# Patient Record
Sex: FEMALE | Race: WHITE | NOT HISPANIC OR LATINO | Employment: OTHER | URBAN - METROPOLITAN AREA
[De-identification: names, ages, dates, MRNs, and addresses within clinical notes are randomized per-mention and may not be internally consistent; named-entity substitution may affect disease eponyms.]

---

## 2023-06-14 ENCOUNTER — OFFICE VISIT (OUTPATIENT)
Dept: FAMILY MEDICINE CLINIC | Facility: CLINIC | Age: 68
End: 2023-06-14
Payer: COMMERCIAL

## 2023-06-14 ENCOUNTER — LAB (OUTPATIENT)
Dept: LAB | Facility: HOSPITAL | Age: 68
End: 2023-06-14
Payer: COMMERCIAL

## 2023-06-14 VITALS
TEMPERATURE: 96.5 F | SYSTOLIC BLOOD PRESSURE: 138 MMHG | HEART RATE: 68 BPM | WEIGHT: 225.6 LBS | HEIGHT: 60 IN | DIASTOLIC BLOOD PRESSURE: 80 MMHG | BODY MASS INDEX: 44.29 KG/M2

## 2023-06-14 DIAGNOSIS — E55.9 VITAMIN D DEFICIENCY: ICD-10-CM

## 2023-06-14 DIAGNOSIS — I10 PRIMARY HYPERTENSION: ICD-10-CM

## 2023-06-14 DIAGNOSIS — I10 PRIMARY HYPERTENSION: Primary | ICD-10-CM

## 2023-06-14 LAB
25(OH)D3 SERPL-MCNC: 52.7 NG/ML (ref 30–100)
ANION GAP SERPL CALCULATED.3IONS-SCNC: 12 MMOL/L (ref 5–15)
BUN SERPL-MCNC: 23 MG/DL (ref 8–23)
BUN/CREAT SERPL: 25 (ref 7–25)
CALCIUM SPEC-SCNC: 10.1 MG/DL (ref 8.6–10.5)
CHLORIDE SERPL-SCNC: 104 MMOL/L (ref 98–107)
CO2 SERPL-SCNC: 22 MMOL/L (ref 22–29)
CREAT SERPL-MCNC: 0.92 MG/DL (ref 0.57–1)
EGFRCR SERPLBLD CKD-EPI 2021: 68.4 ML/MIN/1.73
GLUCOSE SERPL-MCNC: 93 MG/DL (ref 65–99)
POTASSIUM SERPL-SCNC: 4.4 MMOL/L (ref 3.5–5.2)
SODIUM SERPL-SCNC: 138 MMOL/L (ref 136–145)

## 2023-06-14 PROCEDURE — 82306 VITAMIN D 25 HYDROXY: CPT

## 2023-06-14 PROCEDURE — 80048 BASIC METABOLIC PNL TOTAL CA: CPT

## 2023-06-14 PROCEDURE — 99203 OFFICE O/P NEW LOW 30 MIN: CPT | Performed by: FAMILY MEDICINE

## 2023-06-14 RX ORDER — FLUTICASONE PROPIONATE 50 MCG
1 SPRAY, SUSPENSION (ML) NASAL DAILY
COMMUNITY
Start: 2023-04-05

## 2023-06-14 RX ORDER — MULTIVIT-MIN/IRON/FOLIC ACID/K 18-600-40
2000 CAPSULE ORAL DAILY
COMMUNITY

## 2023-06-14 RX ORDER — NICOTINE POLACRILEX 4 MG/1
1 GUM, CHEWING ORAL DAILY
COMMUNITY

## 2023-06-14 RX ORDER — LOSARTAN POTASSIUM 50 MG/1
1 TABLET ORAL DAILY
COMMUNITY
Start: 2023-04-17 | End: 2023-06-14 | Stop reason: SDUPTHER

## 2023-06-14 RX ORDER — LOSARTAN POTASSIUM 50 MG/1
50 TABLET ORAL DAILY
Qty: 90 TABLET | Refills: 3 | Status: SHIPPED | OUTPATIENT
Start: 2023-06-14

## 2023-06-14 RX ORDER — LORATADINE 10 MG/1
1 CAPSULE, LIQUID FILLED ORAL DAILY
COMMUNITY

## 2023-06-14 NOTE — ASSESSMENT & PLAN NOTE
Fasting BMP today. Refill of losartan 50mg sent to pharmacy today. Goal <135/80.  Lifestyle changes, diet, increased exercise.

## 2023-06-14 NOTE — PROGRESS NOTES
New Patient Office Visit      Patient Name: Stella Lopez  : 1955   MRN: [id number]     Chief Complaint:    Chief Complaint   Patient presents with    Hypertension     New pt wants to establish care and discuss HTN rx       Subjective   History of Present Illness    History of Present Illness: Stella Lopez is a 68 y.o. female who is here today to establish care.    Previous primary care: Dr. Francis Doe [place]    Chronic health conditions:  HTN: Has been on losartan for years, no recent changes    Other physicians currently involved in patient's care:  Patient Care Team:  Silvino Carmona DO as PCP - General (Family Medicine)    Acute Concerns:  She denies acute concerns today.    This patient is accompanied by their self who contributes to the history of their care.    The following portions of the patient's history were reviewed and updated as appropriate: allergies, current medications, past family history, past medical history, past social history, past surgical history and problem list.    Subjective      Review of Systems:   Review of Systems - See HPI and new patient paperwork scanned into chart    Past Medical History:   Past Medical History:   Diagnosis Date    Allergic 1970    I had allergies most of my life    GERD (gastroesophageal reflux disease) 2020    Meds helps    Hypertension 3/15/2010    Date is approx    Obesity 2005    Ever since menapose       Past Surgical History:   Past Surgical History:   Procedure Laterality Date    COLONOSCOPY  3/27/22    Polyps  removed non cancerous       Family History:   Family History   Problem Relation Age of Onset    Cancer Mother         She several yrs       Social History:   Social History     Socioeconomic History    Marital status:    Tobacco Use    Smoking status: Former     Packs/day: 1.00     Years: 10.00     Pack years: 10.00     Types: Cigarettes     Start date: 1974     Quit date: 1985     Years since  "quittin.1    Smokeless tobacco: Never    Tobacco comments:     Quit several urs and haven't smoked since   Vaping Use    Vaping Use: Never used   Substance and Sexual Activity    Alcohol use: Yes     Comment: I drink occasional glassof wine    Drug use: Never    Sexual activity: Not Currently     Partners: Male     Birth control/protection: Pill     Comment: Na       Tobacco History:   Social History     Tobacco Use   Smoking Status Former    Packs/day: 1.00    Years: 10.00    Pack years: 10.00    Types: Cigarettes    Start date: 1974    Quit date: 1985    Years since quittin.1   Smokeless Tobacco Never   Tobacco Comments    Quit several urs and haven't smoked since       Medications:     Current Outpatient Medications:     fluticasone (FLONASE) 50 MCG/ACT nasal spray, 1 spray into the nostril(s) as directed by provider Daily., Disp: , Rfl:     Loratadine 10 MG capsule, Take 1 capsule by mouth Daily., Disp: , Rfl:     losartan (COZAAR) 50 MG tablet, Take 1 tablet by mouth Daily., Disp: 90 tablet, Rfl: 3    Omeprazole 20 MG tablet delayed-release, Take 20 mg by mouth Daily., Disp: , Rfl:     Vitamin D, Cholecalciferol, 50 MCG (2000 UT) capsule, Take 1 capsule by mouth Daily. Indications: Vitamin D Deficiency, Disp: , Rfl:     Allergies:   No Known Allergies    Objective   Objective     Physical Exam:  Vital Signs:   Vitals:    23 0849   BP: 138/80   BP Location: Left arm   Patient Position: Sitting   Cuff Size: Adult   Pulse: 68   Temp: 96.5 °F (35.8 °C)   TempSrc: Infrared   Weight: 102 kg (225 lb 9.6 oz)   Height: 152.4 cm (60\")     Body mass index is 44.06 kg/m².     Physical Exam  Nursing note reviewed  Const: NAD, A&Ox4, Pleasant, Cooperative  Eyes: EOMI, no conjunctivitis  ENT: No nasal discharge present, neck supple  Cardiac: Regular rate and rhythm, no cyanosis  Resp: Respiratory rate within normal limits, no increased work of breathing, no audible wheezing or retractions noted  GI: " No distention or ascites  MSK: Motor and sensation grossly intact in bilateral upper extremities  Neurologic: CN II-XII grossly intact  Psych: Appropriate mood and behavior.  Skin: Warm, dry  Procedures/Radiology     Procedures  No radiology results for the last 7 days     Assessment & Plan   Assessment / Plan      Assessment/Plan:   Problems Addressed This Visit  Diagnoses and all orders for this visit:    1. Primary hypertension (Primary)  Assessment & Plan:  Fasting BMP today. Refill of losartan 50mg sent to pharmacy today. Goal <135/80.  Lifestyle changes, diet, increased exercise.    Orders:  -     losartan (COZAAR) 50 MG tablet; Take 1 tablet by mouth Daily.  Dispense: 90 tablet; Refill: 3  -     Basic Metabolic Panel; Future    2. Vitamin D deficiency  -     Vitamin D,25-Hydroxy; Future      Problem List Items Addressed This Visit          Cardiac and Vasculature    Primary hypertension - Primary    Overview     Losartan 50 mg daily         Current Assessment & Plan     Fasting BMP today. Refill of losartan 50mg sent to pharmacy today. Goal <135/80.  Lifestyle changes, diet, increased exercise.         Relevant Medications    losartan (COZAAR) 50 MG tablet    Other Relevant Orders    Basic Metabolic Panel       Endocrine and Metabolic    Vitamin D deficiency    Overview     GYN started her on for preventative reasons (bone density)         Relevant Orders    Vitamin D,25-Hydroxy       We will plan to obtain previous records both for chronic preventative care as well as those related to the current episode of care.  Any records that the patient brought with her today were reviewed personally by me during the visit today and will be scanned into the chart for posterity.    Discussed the nature of the disease including relevant anatomy & expected clinical course, risks, complications, implications, management, safe and proper use of medications. Plan of care reviewed with patient at the conclusion of today's  visit. Education was provided regarding diagnosis and management.  Patient verbalizes understanding of and agreement with management plan. Encouraged therapeutic lifestyle changes including low calorie diet with plenty of fruits and vegetables, daily exercise, medication compliance, and keeping scheduled follow up appointments with me and any other providers. Encouraged patient to have appointment for complete physical, fasting labs, appropriate screenings, and immunizations on an annual basis. Discussed extended office hours, shared call, and appropriate use of the ER. Discussed generally we do not prescribe chronic controlled substances from this office. Appropriate referrals will be made to pain management and psychiatry if needed. Stressed the importance and expectation of medical compliance with plan of care, medications, and follow up appointments.    There are no Patient Instructions on file for this visit.    Follow Up:   Return in about 6 months (around 12/14/2023) for Annual.      DO SHANELLE Chapman RD  [facility]  [address]  [facility] [address]  Fax [phone]  Phone [phone]

## 2023-08-10 ENCOUNTER — PATIENT MESSAGE (OUTPATIENT)
Dept: FAMILY MEDICINE CLINIC | Facility: CLINIC | Age: 68
End: 2023-08-10
Payer: COMMERCIAL

## 2023-10-18 RX ORDER — FLUTICASONE PROPIONATE 50 MCG
1 SPRAY, SUSPENSION (ML) NASAL DAILY
Qty: 50 ML | Refills: 0 | Status: SHIPPED | OUTPATIENT
Start: 2023-10-18

## 2024-01-10 RX ORDER — FLUTICASONE PROPIONATE 50 MCG
1 SPRAY, SUSPENSION (ML) NASAL DAILY
Qty: 50 ML | Refills: 0 | Status: SHIPPED | OUTPATIENT
Start: 2024-01-10

## 2024-01-26 ENCOUNTER — OFFICE VISIT (OUTPATIENT)
Dept: FAMILY MEDICINE CLINIC | Facility: CLINIC | Age: 69
End: 2024-01-26
Payer: MEDICARE

## 2024-01-26 VITALS
HEART RATE: 78 BPM | BODY MASS INDEX: 46.02 KG/M2 | OXYGEN SATURATION: 98 % | SYSTOLIC BLOOD PRESSURE: 126 MMHG | HEIGHT: 60 IN | WEIGHT: 234.4 LBS | DIASTOLIC BLOOD PRESSURE: 82 MMHG

## 2024-01-26 DIAGNOSIS — Z91.09 ENVIRONMENTAL ALLERGIES: ICD-10-CM

## 2024-01-26 DIAGNOSIS — E55.9 VITAMIN D DEFICIENCY: ICD-10-CM

## 2024-01-26 DIAGNOSIS — Z13.29 SCREENING FOR ENDOCRINE DISORDER: ICD-10-CM

## 2024-01-26 DIAGNOSIS — Z00.00 MEDICARE ANNUAL WELLNESS VISIT, SUBSEQUENT: Primary | ICD-10-CM

## 2024-01-26 DIAGNOSIS — Z78.0 ASYMPTOMATIC AGE-RELATED POSTMENOPAUSAL STATE: ICD-10-CM

## 2024-01-26 DIAGNOSIS — I10 PRIMARY HYPERTENSION: ICD-10-CM

## 2024-01-26 DIAGNOSIS — Z13.220 SCREENING FOR HYPERLIPIDEMIA: ICD-10-CM

## 2024-01-26 DIAGNOSIS — Z13.0 SCREENING FOR DEFICIENCY ANEMIA: ICD-10-CM

## 2024-01-26 PROCEDURE — G0439 PPPS, SUBSEQ VISIT: HCPCS | Performed by: FAMILY MEDICINE

## 2024-01-26 PROCEDURE — 3074F SYST BP LT 130 MM HG: CPT | Performed by: FAMILY MEDICINE

## 2024-01-26 PROCEDURE — 3079F DIAST BP 80-89 MM HG: CPT | Performed by: FAMILY MEDICINE

## 2024-01-26 RX ORDER — MONTELUKAST SODIUM 10 MG/1
10 TABLET ORAL NIGHTLY
Qty: 30 TABLET | Refills: 2 | Status: SHIPPED | OUTPATIENT
Start: 2024-01-26

## 2024-01-26 NOTE — PROGRESS NOTES
The ABCs of the Annual Wellness Visit  Subsequent Medicare Wellness Visit    Subjective      Stella Lopez is a 68 y.o. female who presents for a Subsequent Medicare Wellness Visit.    The following portions of the patient's history were reviewed and   updated as appropriate: allergies, current medications, past family history, past medical history, past social history, past surgical history, and problem list.    Compared to one year ago, the patient feels her physical   health is the same.   HTN: Losartan 50mg daily  Environmental allergies: Loratadine 10mg (previously allegra), fluticasone  Mammogram July 2023  Colonoscopy March 2023 in [place]    The following portions of the patient's history were reviewed and updated as appropriate: allergies, current medications, past family history, past medical history, past social history, past surgical history and problem list.       Allied Screenings    N/A         Date      Eye Exam   Dec 2023    []              [x]   Up to date    Location:   []   Recommended       Counseled every 2 years in those without known issues, yearly if wearing glasses or contacts      Dental Exam   Next Wednesday, usually every 3 months    []           []   Up to date   Location:   [x]   Recommended       Counseled, recommended cleanings every 6 months, daily brushing and flossing        Skin Cancer Screening        [x]            []   Up to date   Location:   []   Recommended Counseled on regular sunscreen wear, self-skin checks      Obesity Counseling        []        [x]   Complete   []   Nutritionist referral   []   Declined Counseled on moderate portions, low meat diet focusing on whole foods and plant-based protein       Compared to one year ago, the patient feels her mental   health is the same.    Recent Hospitalizations:  She was not admitted to the hospital during the last year.       Current Medical Providers:  Patient Care Team:  Silvino Carmona DO as PCP - General  "(Family Medicine)    Outpatient Medications Prior to Visit   Medication Sig Dispense Refill    fluticasone (FLONASE) 50 MCG/ACT nasal spray 1 spray into the nostril(s) as directed by provider Daily. 50 mL 0    Loratadine 10 MG capsule Take 1 capsule by mouth Daily.      losartan (COZAAR) 50 MG tablet Take 1 tablet by mouth Daily. 90 tablet 3    Vitamin D, Cholecalciferol, 50 MCG (2000 UT) capsule Take 1 capsule by mouth Daily. Indications: Vitamin D Deficiency      Omeprazole 20 MG tablet delayed-release Take 20 mg by mouth Daily. 90 each 1     No facility-administered medications prior to visit.       No opioid medication identified on active medication list. I have reviewed chart for other potential  high risk medication/s and harmful drug interactions in the elderly.        Aspirin is not on active medication list.  Aspirin use is not indicated based on review of current medical condition/s. Risk of harm outweighs potential benefits.  .    Patient Active Problem List   Diagnosis    Primary hypertension    Vitamin D deficiency     Advance Care Planning   Advance Care Planning     Advance Directive is not on file.  ACP discussion was held with the patient during this visit. Patient has an advance directive (not in EMR), copy requested.     Objective    Vitals:    01/26/24 1359   BP: 126/82   BP Location: Left arm   Patient Position: Sitting   Cuff Size: Adult   Pulse: 78   SpO2: 98%   Weight: 106 kg (234 lb 6.4 oz)   Height: 152.4 cm (60\")     Estimated body mass index is 45.78 kg/m² as calculated from the following:    Height as of this encounter: 152.4 cm (60\").    Weight as of this encounter: 106 kg (234 lb 6.4 oz).    Class 3 Severe Obesity (BMI >=40). Obesity-related health conditions include the following: hypertension and GERD. Obesity is unchanged. BMI is is above average; BMI management plan is completed. We discussed portion control and increasing exercise.      Does the patient have evidence of cognitive " impairment?   No  ATTENTION  What is the year: correct  What is the month of the year: correct  What is the day of the week?: correct  What is the date?: correct  MEMORY  Repeat address three times, only score third attempt: [address] [place], [place]: 5  HOW MANY ANIMALS DID THE PATIENT NAME  Verbal Fluency -- Animal Names (0-25): 22+  CLOCK DRAWING  Clock Drawing: All Correct  MEMORY RECALL  Tell me what you remember about that name and address we were repeating at the beginnin  ACE TOTAL SCORE  Total ACE Score - <25/30 strongly suggests cognitive impairment; <21/30 almost certainly shows dementia: 25           HEALTH RISK ASSESSMENT    Smoking Status:  Social History     Tobacco Use   Smoking Status Former    Packs/day: 1.00    Years: 10.00    Additional pack years: 0.00    Total pack years: 10.00    Types: Cigarettes    Start date: 1974    Quit date: 1985    Years since quittin.7   Smokeless Tobacco Never   Tobacco Comments    Quit several urs and haven’t smoked since     Alcohol Consumption:  Social History     Substance and Sexual Activity   Alcohol Use Yes    Comment: I drink occasional glassof wine     Fall Risk Screen:    STEADI Fall Risk Assessment was completed, and patient is at LOW risk for falls.Assessment completed on:2024    Depression Screenin/26/2024     2:01 PM   PHQ-2/PHQ-9 Depression Screening   Little Interest or Pleasure in Doing Things 0-->not at all   Feeling Down, Depressed or Hopeless 0-->not at all   PHQ-9: Brief Depression Severity Measure Score 0       Health Habits and Functional and Cognitive Screening:       No data to display                Age-appropriate Screening Schedule:  Refer to the list below for future screening recommendations based on patient's age, sex and/or medical conditions. Orders for these recommended tests are listed in the plan section. The patient has been provided with a written plan.    Health Maintenance    Topic Date Due    DXA SCAN  Never done    BMI FOLLOWUP  Never done    COLORECTAL CANCER SCREENING  Never done    ZOSTER VACCINE (1 of 2) Never done    HEPATITIS C SCREENING  Never done    ANNUAL WELLNESS VISIT  Never done    Pneumococcal Vaccine 65+ (1 of 1 - PCV) 08/18/2024 (Originally 5/15/2021)    TDAP/TD VACCINES (1 - Tdap) 01/26/2025 (Originally 5/15/1975)    MAMMOGRAM  07/14/2025    COVID-19 Vaccine  Completed    INFLUENZA VACCINE  Completed                  CMS Preventative Services Quick Reference  Risk Factors Identified During Encounter:    None Identified    The above risks/problems have been discussed with the patient.  Pertinent information has been shared with the patient in the After Visit Summary.    Diagnoses and all orders for this visit:    1. Primary hypertension (Primary)    2. Environmental allergies  -     montelukast (Singulair) 10 MG tablet; Take 1 tablet by mouth Every Night.  Dispense: 30 tablet; Refill: 2    3. Asymptomatic age-related postmenopausal state  -     DEXA Bone Density Axial; Future      Problem List Items Addressed This Visit          Cardiac and Vasculature    Primary hypertension    Overview     Losartan 50 mg daily            Endocrine and Metabolic    Vitamin D deficiency    Overview     GYN started her on for preventative reasons (bone density)         Relevant Orders    Vitamin D,25-Hydroxy (Completed)     Other Visit Diagnoses       Medicare annual wellness visit, subsequent    -  Primary    Environmental allergies        Relevant Medications    montelukast (Singulair) 10 MG tablet    Asymptomatic age-related postmenopausal state        Relevant Orders    DEXA Bone Density Axial    Screening for deficiency anemia        Relevant Orders    CBC & Differential (Completed)    Screening for endocrine disorder        Relevant Orders    Comprehensive Metabolic Panel (Completed)    Urinalysis With Microscopic If Indicated (No Culture) - Urine, Clean Catch (Completed)    TSH  (Completed)    Screening for hyperlipidemia        Relevant Orders    Lipid Panel (Completed)          The wellness exam has been reviewed in detail.  The patient has been fully counseled on preventative guidelines for vaccines, cancer screenings, and other health maintenance needs.  Functional testing has been performed to assess capacity for independent living and need for other medical interventions.   The patient was counseled on maintaining a lifestyle to promote good health and to minimize chronic diseases.  The patient has been assisted with scheduling healthcare procedures for the coming year and given a written document outlining these recommendations.      Follow Up:   Next Medicare Wellness visit to be scheduled in 1 year.      An After Visit Summary and PPPS were made available to the patient.

## 2024-01-26 NOTE — PATIENT INSTRUCTIONS
Advance Care Planning and Advance Directives     You make decisions on a daily basis - decisions about where you want to live, your career, your home, your life. Perhaps one of the most important decisions you face is your choice for future medical care. Take time to talk with your family and your healthcare team and start planning today.  Advance Care Planning is a process that can help you:  Understand possible future healthcare decisions in light of your own experiences  Reflect on those decision in light of your goals and values  Discuss your decisions with those closest to you and the healthcare professionals that care for you  Make a plan by creating a document that reflects your wishes    Surrogate Decision Maker  In the event of a medical emergency, which has left you unable to communicate or to make your own decisions, you would need someone to make decisions for you.  It is important to discuss your preferences for medical treatment with this person while you are in good health.     Qualities of a surrogate decision maker:  Willing to take on this role and responsibility  Knows what you want for future medical care  Willing to follow your wishes even if they don't agree with them  Able to make difficult medical decisions under stressful circumstances    Advance Directives  These are legal documents you can create that will guide your healthcare team and decision maker(s) when needed. These documents can be stored in the electronic medical record.    Living Will - a legal document to guide your care if you have a terminal condition or a serious illness and are unable to communicate. States vary by statute in document names/types, but most forms may include one or more of the following:        -  Directions regarding life-prolonging treatments        -  Directions regarding artificially provided nutrition/hydration        -  Choosing a healthcare decision maker        -  Direction regarding organ/tissue  donation    Durable Power of  for Healthcare - this document names an -in-fact to make medical decisions for you, but it may also allow this person to make personal and financial decisions for you. Please seek the advice of an  if you need this type of document.    **Advance Directives are not required and no one may discriminate against you if you do not sign one.    Medical Orders  Many states allow specific forms/orders signed by your physician to record your wishes for medical treatment in your current state of health. This form, signed in personal communication with your physician, addresses resuscitation and other medical interventions that you may or may not want.      For more information or to schedule a time with a [facility] Advance Care Planning Facilitator contact: [facility] or call [phone] and someone will contact you directly.  You are due for Shingrix vaccination series ( the newest shingles vaccine).  It is a two shot series spaced 2-6 months apart. Please get this vaccine series started at your earliest convenience at your local pharmacy to help avoid shingles outbreak. It is more effective than the old Zostavax vaccine and is recommended even if you have had the Zostavax vaccine in the past.  Once the Shingrix series is completed, it does not need to be repeated.   For more information, please look at the website below:  [facility] Shingrix Vaccine Information      Medicare Wellness  Personal Prevention Plan of Service     Date of Office Visit:    Encounter Provider:  Silvino Carmona DO  Place of Service:  [facility]  Patient Name: Stella Lopez  :  1955    As part of the Medicare Wellness portion of your visit today, we are providing you with this personalized preventive plan of services (PPPS). This plan is based upon recommendations of the [place] Preventive Services Task Force (USPSTF) and the Advisory  Committee on Immunization Practices (ACIP).    This lists the preventive care services that should be considered, and provides dates of when you are due. Items listed as completed are up-to-date and do not require any further intervention.    Health Maintenance   Topic Date Due   • DXA SCAN  Never done   • BMI FOLLOWUP  Never done   • COLORECTAL CANCER SCREENING  Never done   • ZOSTER VACCINE (1 of 2) Never done   • HEPATITIS C SCREENING  Never done   • ANNUAL WELLNESS VISIT  Never done   • Pneumococcal Vaccine 65+ (1 of 1 - PCV) 08/18/2024 (Originally 5/15/2021)   • TDAP/TD VACCINES (1 - Tdap) 01/26/2025 (Originally 5/15/1975)   • MAMMOGRAM  07/14/2025   • COVID-19 Vaccine  Completed   • INFLUENZA VACCINE  Completed       Orders Placed This Encounter   Procedures   • DEXA Bone Density Axial     Standing Status:   Future     Standing Expiration Date:   1/26/2025     Order Specific Question:   Is patient taking or have taken long term Glucocorticoid (steroids)?     Answer:   No     Order Specific Question:   Does the patient have rheumatoid arthritis?     Answer:   No     Order Specific Question:   Reason for Exam:     Answer:   osteoporosis screening     Order Specific Question:   Release to patient     Answer:   Routine Release [[id number]]       No follow-ups on file.

## 2024-01-29 ENCOUNTER — LAB (OUTPATIENT)
Dept: LAB | Facility: HOSPITAL | Age: 69
End: 2024-01-29
Payer: MEDICARE

## 2024-01-29 DIAGNOSIS — Z13.220 SCREENING FOR HYPERLIPIDEMIA: ICD-10-CM

## 2024-01-29 DIAGNOSIS — Z13.29 SCREENING FOR ENDOCRINE DISORDER: ICD-10-CM

## 2024-01-29 DIAGNOSIS — Z13.0 SCREENING FOR DEFICIENCY ANEMIA: ICD-10-CM

## 2024-01-29 DIAGNOSIS — E55.9 VITAMIN D DEFICIENCY: ICD-10-CM

## 2024-01-29 LAB
ALBUMIN SERPL-MCNC: 4.5 G/DL (ref 3.5–5.2)
ALBUMIN/GLOB SERPL: 1.4 G/DL
ALP SERPL-CCNC: 106 U/L (ref 39–117)
ALT SERPL W P-5'-P-CCNC: 14 U/L (ref 1–33)
ANION GAP SERPL CALCULATED.3IONS-SCNC: 14.5 MMOL/L (ref 5–15)
AST SERPL-CCNC: 16 U/L (ref 1–32)
BASOPHILS # BLD AUTO: 0.05 10*3/MM3 (ref 0–0.2)
BASOPHILS NFR BLD AUTO: 0.6 % (ref 0–1.5)
BILIRUB SERPL-MCNC: 0.6 MG/DL (ref 0–1.2)
BILIRUB UR QL STRIP: NEGATIVE
BUN SERPL-MCNC: 15 MG/DL (ref 8–23)
BUN/CREAT SERPL: 16.7 (ref 7–25)
CALCIUM SPEC-SCNC: 9.8 MG/DL (ref 8.6–10.5)
CHLORIDE SERPL-SCNC: 100 MMOL/L (ref 98–107)
CHOLEST SERPL-MCNC: 222 MG/DL (ref 0–200)
CLARITY UR: ABNORMAL
CO2 SERPL-SCNC: 22.5 MMOL/L (ref 22–29)
COLOR UR: YELLOW
CREAT SERPL-MCNC: 0.9 MG/DL (ref 0.57–1)
DEPRECATED RDW RBC AUTO: 41.6 FL (ref 37–54)
EGFRCR SERPLBLD CKD-EPI 2021: 70.2 ML/MIN/1.73
EOSINOPHIL # BLD AUTO: 0.23 10*3/MM3 (ref 0–0.4)
EOSINOPHIL NFR BLD AUTO: 2.9 % (ref 0.3–6.2)
ERYTHROCYTE [DISTWIDTH] IN BLOOD BY AUTOMATED COUNT: 12.1 % (ref 12.3–15.4)
GLOBULIN UR ELPH-MCNC: 3.3 GM/DL
GLUCOSE SERPL-MCNC: 76 MG/DL (ref 65–99)
GLUCOSE UR STRIP-MCNC: NEGATIVE MG/DL
HCT VFR BLD AUTO: 41.2 % (ref 34–46.6)
HDLC SERPL-MCNC: 64 MG/DL (ref 40–60)
HGB BLD-MCNC: 13.8 G/DL (ref 12–15.9)
HGB UR QL STRIP.AUTO: NEGATIVE
IMM GRANULOCYTES # BLD AUTO: 0.02 10*3/MM3 (ref 0–0.05)
IMM GRANULOCYTES NFR BLD AUTO: 0.3 % (ref 0–0.5)
KETONES UR QL STRIP: NEGATIVE
LDLC SERPL CALC-MCNC: 139 MG/DL (ref 0–100)
LDLC/HDLC SERPL: 2.13 {RATIO}
LEUKOCYTE ESTERASE UR QL STRIP.AUTO: NEGATIVE
LYMPHOCYTES # BLD AUTO: 3.15 10*3/MM3 (ref 0.7–3.1)
LYMPHOCYTES NFR BLD AUTO: 39.9 % (ref 19.6–45.3)
MCH RBC QN AUTO: 32.2 PG (ref 26.6–33)
MCHC RBC AUTO-ENTMCNC: 33.5 G/DL (ref 31.5–35.7)
MCV RBC AUTO: 96.3 FL (ref 79–97)
MONOCYTES # BLD AUTO: 0.43 10*3/MM3 (ref 0.1–0.9)
MONOCYTES NFR BLD AUTO: 5.4 % (ref 5–12)
NEUTROPHILS NFR BLD AUTO: 4.01 10*3/MM3 (ref 1.7–7)
NEUTROPHILS NFR BLD AUTO: 50.9 % (ref 42.7–76)
NITRITE UR QL STRIP: NEGATIVE
NRBC BLD AUTO-RTO: 0 /100 WBC (ref 0–0.2)
PH UR STRIP.AUTO: 6.5 [PH] (ref 5–8)
PLATELET # BLD AUTO: 372 10*3/MM3 (ref 140–450)
PMV BLD AUTO: 11.1 FL (ref 6–12)
POTASSIUM SERPL-SCNC: 4.4 MMOL/L (ref 3.5–5.2)
PROT SERPL-MCNC: 7.8 G/DL (ref 6–8.5)
PROT UR QL STRIP: NEGATIVE
RBC # BLD AUTO: 4.28 10*6/MM3 (ref 3.77–5.28)
SODIUM SERPL-SCNC: 137 MMOL/L (ref 136–145)
SP GR UR STRIP: 1.02 (ref 1–1.03)
TRIGL SERPL-MCNC: 109 MG/DL (ref 0–150)
TSH SERPL DL<=0.05 MIU/L-ACNC: 5.12 UIU/ML (ref 0.27–4.2)
UROBILINOGEN UR QL STRIP: ABNORMAL
VLDLC SERPL-MCNC: 19 MG/DL (ref 5–40)
WBC NRBC COR # BLD AUTO: 7.89 10*3/MM3 (ref 3.4–10.8)

## 2024-01-29 PROCEDURE — 80061 LIPID PANEL: CPT

## 2024-01-29 PROCEDURE — 82306 VITAMIN D 25 HYDROXY: CPT

## 2024-01-29 PROCEDURE — 84443 ASSAY THYROID STIM HORMONE: CPT

## 2024-01-29 PROCEDURE — 85025 COMPLETE CBC W/AUTO DIFF WBC: CPT

## 2024-01-29 PROCEDURE — 80053 COMPREHEN METABOLIC PANEL: CPT

## 2024-01-29 PROCEDURE — 81003 URINALYSIS AUTO W/O SCOPE: CPT

## 2024-01-30 LAB — 25(OH)D3 SERPL-MCNC: 45.2 NG/ML (ref 30–100)

## 2024-02-16 ENCOUNTER — HOSPITAL ENCOUNTER (EMERGENCY)
Facility: HOSPITAL | Age: 69
Discharge: HOME | End: 2024-02-17
Attending: EMERGENCY MEDICINE
Payer: MEDICARE

## 2024-02-16 ENCOUNTER — APPOINTMENT (OUTPATIENT)
Dept: GENERAL RADIOLOGY | Facility: HOSPITAL | Age: 69
End: 2024-02-16
Payer: MEDICARE

## 2024-02-16 ENCOUNTER — APPOINTMENT (OUTPATIENT)
Dept: CT IMAGING | Facility: HOSPITAL | Age: 69
End: 2024-02-16
Payer: MEDICARE

## 2024-02-16 DIAGNOSIS — I10 POORLY-CONTROLLED HYPERTENSION: ICD-10-CM

## 2024-02-16 DIAGNOSIS — R07.89 ATYPICAL CHEST PAIN: Primary | ICD-10-CM

## 2024-02-16 LAB
BASOPHILS # BLD AUTO: 0.07 10*3/MM3 (ref 0–0.2)
BASOPHILS NFR BLD AUTO: 0.7 % (ref 0–1.5)
DEPRECATED RDW RBC AUTO: 43.8 FL (ref 37–54)
EOSINOPHIL # BLD AUTO: 0.29 10*3/MM3 (ref 0–0.4)
EOSINOPHIL NFR BLD AUTO: 2.7 % (ref 0.3–6.2)
ERYTHROCYTE [DISTWIDTH] IN BLOOD BY AUTOMATED COUNT: 12.1 % (ref 12.3–15.4)
HCT VFR BLD AUTO: 39.5 % (ref 34–46.6)
HGB BLD-MCNC: 12.6 G/DL (ref 12–15.9)
IMM GRANULOCYTES # BLD AUTO: 0.03 10*3/MM3 (ref 0–0.05)
IMM GRANULOCYTES NFR BLD AUTO: 0.3 % (ref 0–0.5)
LYMPHOCYTES # BLD AUTO: 3.4 10*3/MM3 (ref 0.7–3.1)
LYMPHOCYTES NFR BLD AUTO: 32 % (ref 19.6–45.3)
MCH RBC QN AUTO: 31.3 PG (ref 26.6–33)
MCHC RBC AUTO-ENTMCNC: 31.9 G/DL (ref 31.5–35.7)
MCV RBC AUTO: 98.3 FL (ref 79–97)
MONOCYTES # BLD AUTO: 0.72 10*3/MM3 (ref 0.1–0.9)
MONOCYTES NFR BLD AUTO: 6.8 % (ref 5–12)
NEUTROPHILS NFR BLD AUTO: 57.5 % (ref 42.7–76)
NEUTROPHILS NFR BLD AUTO: 6.1 10*3/MM3 (ref 1.7–7)
NRBC BLD AUTO-RTO: 0 /100 WBC (ref 0–0.2)
PLATELET # BLD AUTO: 350 10*3/MM3 (ref 140–450)
PMV BLD AUTO: 10.5 FL (ref 6–12)
RBC # BLD AUTO: 4.02 10*6/MM3 (ref 3.77–5.28)
WBC NRBC COR # BLD AUTO: 10.61 10*3/MM3 (ref 3.4–10.8)

## 2024-02-16 PROCEDURE — 85379 FIBRIN DEGRADATION QUANT: CPT | Performed by: EMERGENCY MEDICINE

## 2024-02-16 PROCEDURE — 83690 ASSAY OF LIPASE: CPT

## 2024-02-16 PROCEDURE — 87636 SARSCOV2 & INF A&B AMP PRB: CPT | Performed by: EMERGENCY MEDICINE

## 2024-02-16 PROCEDURE — 71045 X-RAY EXAM CHEST 1 VIEW: CPT

## 2024-02-16 PROCEDURE — 36415 COLL VENOUS BLD VENIPUNCTURE: CPT

## 2024-02-16 PROCEDURE — 83605 ASSAY OF LACTIC ACID: CPT | Performed by: EMERGENCY MEDICINE

## 2024-02-16 PROCEDURE — 99284 EMERGENCY DEPT VISIT MOD MDM: CPT

## 2024-02-16 PROCEDURE — 83880 ASSAY OF NATRIURETIC PEPTIDE: CPT

## 2024-02-16 PROCEDURE — 93005 ELECTROCARDIOGRAM TRACING: CPT

## 2024-02-16 PROCEDURE — 85025 COMPLETE CBC W/AUTO DIFF WBC: CPT

## 2024-02-16 RX ORDER — ASPIRIN 81 MG/1
324 TABLET, CHEWABLE ORAL ONCE
Status: DISCONTINUED | OUTPATIENT
Start: 2024-02-16 | End: 2024-02-16

## 2024-02-16 RX ORDER — SODIUM CHLORIDE 0.9 % (FLUSH) 0.9 %
10 SYRINGE (ML) INJECTION AS NEEDED
Status: DISCONTINUED | OUTPATIENT
Start: 2024-02-16 | End: 2024-02-17 | Stop reason: HOSPADM

## 2024-02-17 VITALS
WEIGHT: 225 LBS | OXYGEN SATURATION: 96 % | TEMPERATURE: 97.5 F | BODY MASS INDEX: 44.17 KG/M2 | SYSTOLIC BLOOD PRESSURE: 156 MMHG | HEART RATE: 69 BPM | HEIGHT: 60 IN | RESPIRATION RATE: 14 BRPM | DIASTOLIC BLOOD PRESSURE: 76 MMHG

## 2024-02-17 LAB
ALBUMIN SERPL-MCNC: 4.6 G/DL (ref 3.5–5.2)
ALBUMIN/GLOB SERPL: 1.8 G/DL
ALP SERPL-CCNC: 111 U/L (ref 39–117)
ALT SERPL W P-5'-P-CCNC: 10 U/L (ref 1–33)
ANION GAP SERPL CALCULATED.3IONS-SCNC: 10 MMOL/L (ref 5–15)
AST SERPL-CCNC: 14 U/L (ref 1–32)
BILIRUB SERPL-MCNC: 0.2 MG/DL (ref 0–1.2)
BUN SERPL-MCNC: 20 MG/DL (ref 8–23)
BUN/CREAT SERPL: 21.7 (ref 7–25)
CALCIUM SPEC-SCNC: 9.4 MG/DL (ref 8.6–10.5)
CHLORIDE SERPL-SCNC: 101 MMOL/L (ref 98–107)
CO2 SERPL-SCNC: 25 MMOL/L (ref 22–29)
CREAT SERPL-MCNC: 0.92 MG/DL (ref 0.57–1)
D DIMER PPP FEU-MCNC: 0.35 MCGFEU/ML (ref 0–0.67)
D-LACTATE SERPL-SCNC: 1.2 MMOL/L (ref 0.5–2)
EGFRCR SERPLBLD CKD-EPI 2021: 68.4 ML/MIN/1.73
FLUAV RNA RESP QL NAA+PROBE: NOT DETECTED
FLUBV RNA RESP QL NAA+PROBE: NOT DETECTED
GLOBULIN UR ELPH-MCNC: 2.5 GM/DL
GLUCOSE SERPL-MCNC: 104 MG/DL (ref 65–99)
HOLD SPECIMEN: NORMAL
LIPASE SERPL-CCNC: 42 U/L (ref 13–60)
NT-PROBNP SERPL-MCNC: 61.6 PG/ML (ref 0–900)
POTASSIUM SERPL-SCNC: 4.2 MMOL/L (ref 3.5–5.2)
PROT SERPL-MCNC: 7.1 G/DL (ref 6–8.5)
QT INTERVAL: 394 MS
QTC INTERVAL: 449 MS
SARS-COV-2 RNA RESP QL NAA+PROBE: NOT DETECTED
SODIUM SERPL-SCNC: 136 MMOL/L (ref 136–145)
TROPONIN T SERPL HS-MCNC: 8 NG/L
WHOLE BLOOD HOLD COAG: NORMAL
WHOLE BLOOD HOLD SPECIMEN: NORMAL

## 2024-02-17 PROCEDURE — 80053 COMPREHEN METABOLIC PANEL: CPT

## 2024-02-17 PROCEDURE — 84484 ASSAY OF TROPONIN QUANT: CPT

## 2024-02-17 PROCEDURE — 25010000002 KETOROLAC TROMETHAMINE PER 15 MG: Performed by: EMERGENCY MEDICINE

## 2024-02-17 PROCEDURE — 96374 THER/PROPH/DIAG INJ IV PUSH: CPT

## 2024-02-17 RX ORDER — DICYCLOMINE HYDROCHLORIDE 10 MG/1
20 CAPSULE ORAL ONCE
Status: COMPLETED | OUTPATIENT
Start: 2024-02-17 | End: 2024-02-17

## 2024-02-17 RX ORDER — KETOROLAC TROMETHAMINE 15 MG/ML
15 INJECTION, SOLUTION INTRAMUSCULAR; INTRAVENOUS ONCE
Status: COMPLETED | OUTPATIENT
Start: 2024-02-17 | End: 2024-02-17

## 2024-02-17 RX ORDER — DICYCLOMINE HCL 20 MG
20 TABLET ORAL EVERY 6 HOURS
Qty: 30 TABLET | Refills: 0 | Status: SHIPPED | OUTPATIENT
Start: 2024-02-17

## 2024-02-17 RX ORDER — SIMETHICONE 80 MG
120 TABLET,CHEWABLE ORAL ONCE
Status: COMPLETED | OUTPATIENT
Start: 2024-02-17 | End: 2024-02-17

## 2024-02-17 RX ORDER — SIMETHICONE 125 MG
1 CAPSULE ORAL EVERY 6 HOURS PRN
Qty: 30 CAPSULE | Refills: 0 | Status: SHIPPED | OUTPATIENT
Start: 2024-02-17

## 2024-02-17 RX ADMIN — DICYCLOMINE HYDROCHLORIDE 20 MG: 10 CAPSULE ORAL at 02:44

## 2024-02-17 RX ADMIN — KETOROLAC TROMETHAMINE 15 MG: 15 INJECTION, SOLUTION INTRAMUSCULAR; INTRAVENOUS at 00:46

## 2024-02-17 RX ADMIN — SIMETHICONE 120 MG: 80 TABLET, CHEWABLE ORAL at 02:44

## 2024-02-17 NOTE — DISCHARGE INSTRUCTIONS
You can take Tylenol and ibuprofen as needed for pain as directed on package.  Continue your usual medications as prescribed.

## 2024-02-20 ENCOUNTER — OFFICE VISIT (OUTPATIENT)
Dept: FAMILY MEDICINE CLINIC | Facility: CLINIC | Age: 69
End: 2024-02-20
Payer: MEDICARE

## 2024-02-20 VITALS
HEIGHT: 60 IN | DIASTOLIC BLOOD PRESSURE: 72 MMHG | BODY MASS INDEX: 44.8 KG/M2 | SYSTOLIC BLOOD PRESSURE: 118 MMHG | WEIGHT: 228.2 LBS

## 2024-02-20 DIAGNOSIS — R10.11 RIGHT UPPER QUADRANT ABDOMINAL PAIN: ICD-10-CM

## 2024-02-20 DIAGNOSIS — I10 PRIMARY HYPERTENSION: Primary | ICD-10-CM

## 2024-02-20 PROCEDURE — 99213 OFFICE O/P EST LOW 20 MIN: CPT | Performed by: FAMILY MEDICINE

## 2024-02-20 PROCEDURE — 3078F DIAST BP <80 MM HG: CPT | Performed by: FAMILY MEDICINE

## 2024-02-20 PROCEDURE — 3074F SYST BP LT 130 MM HG: CPT | Performed by: FAMILY MEDICINE

## 2024-02-21 ENCOUNTER — HOSPITAL ENCOUNTER (OUTPATIENT)
Dept: BONE DENSITY | Facility: HOSPITAL | Age: 69
Discharge: HOME | End: 2024-02-21
Admitting: FAMILY MEDICINE
Payer: MEDICARE

## 2024-02-21 DIAGNOSIS — Z78.0 ASYMPTOMATIC AGE-RELATED POSTMENOPAUSAL STATE: ICD-10-CM

## 2024-02-21 PROCEDURE — 77080 DXA BONE DENSITY AXIAL: CPT

## 2024-03-11 NOTE — PROGRESS NOTES
Subjective   Stella Lopez is a 68 y.o. female.     Chief Complaint   Patient presents with    Hypertension     Follow-up HTN       History of Present Illness     Stella Lopez presents today for   Chief Complaint   Patient presents with    Hypertension     Follow-up HTN     she is in need of chronic disease followup. she denies acute complaints today.      This patient is accompanied by their self who contributes to the history of their care.    The following portions of the patient's history were reviewed and updated as appropriate: allergies, current medications, past family history, past medical history, past social history, past surgical history and problem list.    Current Outpatient Medications   Medication Instructions    dicyclomine (BENTYL) 20 mg, Oral, Every 6 Hours    fluticasone (FLONASE) 50 MCG/ACT nasal spray 1 spray, Nasal, Daily    Loratadine 10 MG capsule 1 tablet, Oral, Daily    losartan (COZAAR) 50 mg, Oral, Daily    montelukast (SINGULAIR) 10 mg, Oral, Nightly    Omeprazole 20 mg, Oral, Daily    simethicone (MYLICON,GAS-X) 125 mg, Oral, Every 6 Hours PRN    Vitamin D (Cholecalciferol) 2,000 Units, Oral, Daily     Active Ambulatory Problems     Diagnosis Date Noted    Primary hypertension 06/14/2023    Vitamin D deficiency 06/14/2023     Resolved Ambulatory Problems     Diagnosis Date Noted    No Resolved Ambulatory Problems     Past Medical History:   Diagnosis Date    Allergic 9/01/1970    GERD (gastroesophageal reflux disease) 1/22/2020    Hypertension 3/15/2010    Obesity 5/12/2005     Past Surgical History:   Procedure Laterality Date    COLONOSCOPY  3/27/22    Polyps  removed non cancerous     Family History   Problem Relation Age of Onset    Breast cancer Mother 31    Cancer Mother         She several yrs    Ovarian cancer Neg Hx      Social History     Socioeconomic History    Marital status:    Tobacco Use    Smoking status: Former     Current packs/day: 0.00     Average  "packs/day: 1 pack/day for 11.0 years (11.0 ttl pk-yrs)     Types: Cigarettes     Start date: 1974     Quit date: 1985     Years since quittin.8    Smokeless tobacco: Never    Tobacco comments:     Quit several urs and haven’t smoked since   Vaping Use    Vaping status: Never Used   Substance and Sexual Activity    Alcohol use: Yes     Comment: I drink occasional glassof wine    Drug use: Never    Sexual activity: Not Currently     Partners: Male     Birth control/protection: Pill     Comment: Na       Review of Systems  Review of Systems -  General ROS: negative for - chills, fever or night sweats  Cardiovascular ROS: no chest pain or dyspnea on exertion  Gastrointestinal ROS: no abdominal pain, change in bowel habits, or black or bloody stools  Genito-Urinary ROS: no trouble voiding or gross hematuria    Objective   Blood pressure 118/72, height 152.4 cm (60\"), weight 104 kg (228 lb 3.2 oz).  Nursing note reviewed  Physical Exam  Const: NAD, A&Ox4, Pleasant, Cooperative  Eyes: EOMI, no conjunctivitis  ENT: No nasal discharge present, neck supple  Cardiac: Regular rate and rhythm, no cyanosis  Resp: Respiratory rate within normal limits, no increased work of breathing, no audible wheezing or retractions noted  GI: No distention or ascites  Procedures  Assessment & Plan     Problem List Items Addressed This Visit          Cardiac and Vasculature    Primary hypertension - Primary    Overview     Losartan 50 mg daily          Other Visit Diagnoses       Right upper quadrant abdominal pain        Dicyclomine 20mg dialy PRN          See patient diagnoses and orders along with patient instructions for assessment, plan, and changes to care for patient.    There are no Patient Instructions on file for this visit.    No follow-ups on file.    Ambulatory progress note signed and attested to by Silvino Carmona D.O.         "

## 2024-04-22 DIAGNOSIS — Z91.09 ENVIRONMENTAL ALLERGIES: ICD-10-CM

## 2024-04-22 RX ORDER — MONTELUKAST SODIUM 10 MG/1
10 TABLET ORAL
Qty: 90 TABLET | Refills: 1 | Status: SHIPPED | OUTPATIENT
Start: 2024-04-22

## 2024-06-05 ENCOUNTER — TRANSCRIBE ORDERS (OUTPATIENT)
Dept: ADMINISTRATIVE | Facility: HOSPITAL | Age: 69
End: 2024-06-05
Payer: MEDICARE

## 2024-06-05 DIAGNOSIS — Z12.31 BREAST CANCER SCREENING BY MAMMOGRAM: Primary | ICD-10-CM

## 2024-07-02 DIAGNOSIS — I10 PRIMARY HYPERTENSION: ICD-10-CM

## 2024-07-03 RX ORDER — LOSARTAN POTASSIUM 50 MG/1
50 TABLET ORAL DAILY
Qty: 90 TABLET | Refills: 0 | Status: SHIPPED | OUTPATIENT
Start: 2024-07-03

## 2024-07-03 RX ORDER — FLUTICASONE PROPIONATE 50 MCG
SPRAY, SUSPENSION (ML) NASAL
Qty: 48 ML | Refills: 2 | Status: SHIPPED | OUTPATIENT
Start: 2024-07-03

## 2024-07-18 ENCOUNTER — LAB (OUTPATIENT)
Dept: LAB | Facility: HOSPITAL | Age: 69
End: 2024-07-18
Payer: MEDICARE

## 2024-07-18 ENCOUNTER — OFFICE VISIT (OUTPATIENT)
Dept: FAMILY MEDICINE CLINIC | Facility: CLINIC | Age: 69
End: 2024-07-18
Payer: MEDICARE

## 2024-07-18 VITALS
HEIGHT: 61 IN | DIASTOLIC BLOOD PRESSURE: 80 MMHG | RESPIRATION RATE: 16 BRPM | SYSTOLIC BLOOD PRESSURE: 140 MMHG | HEART RATE: 83 BPM | BODY MASS INDEX: 43.95 KG/M2 | WEIGHT: 232.8 LBS | OXYGEN SATURATION: 97 %

## 2024-07-18 DIAGNOSIS — Z91.09 ENVIRONMENTAL ALLERGIES: ICD-10-CM

## 2024-07-18 DIAGNOSIS — H65.93 BILATERAL OTITIS MEDIA WITH EFFUSION: ICD-10-CM

## 2024-07-18 DIAGNOSIS — E03.8 SUBCLINICAL HYPOTHYROIDISM: ICD-10-CM

## 2024-07-18 DIAGNOSIS — I10 PRIMARY HYPERTENSION: Primary | ICD-10-CM

## 2024-07-18 PROBLEM — R79.89 ELEVATED TSH: Status: ACTIVE | Noted: 2024-07-18

## 2024-07-18 LAB — TSH SERPL DL<=0.05 MIU/L-ACNC: 4.97 UIU/ML (ref 0.27–4.2)

## 2024-07-18 PROCEDURE — 99214 OFFICE O/P EST MOD 30 MIN: CPT | Performed by: FAMILY MEDICINE

## 2024-07-18 PROCEDURE — 84443 ASSAY THYROID STIM HORMONE: CPT

## 2024-07-18 PROCEDURE — 84439 ASSAY OF FREE THYROXINE: CPT

## 2024-07-18 PROCEDURE — 3079F DIAST BP 80-89 MM HG: CPT | Performed by: FAMILY MEDICINE

## 2024-07-18 PROCEDURE — 3077F SYST BP >= 140 MM HG: CPT | Performed by: FAMILY MEDICINE

## 2024-07-18 RX ORDER — METHYLPREDNISOLONE 4 MG/1
TABLET ORAL
Qty: 21 EACH | Refills: 0 | Status: SHIPPED | OUTPATIENT
Start: 2024-07-18

## 2024-07-18 NOTE — PROGRESS NOTES
Established Patient Office Visit      Patient Name: Stella Lopez  : 1955   MRN: [id number]   Care Team: Patient Care Team:  Silvino Carmona DO as PCP - General (Family Medicine)    Chief Complaint:    Chief Complaint   Patient presents with    Hypertension     Follow up    Earache     Left ear        History of Present Illness: Stella Lopez is a 69 y.o. female who is here today for chief complaint.    HPI    F/u htn    This patient is accompanied by their self who contributes to the history of their care.    The following portions of the patient's history were reviewed and updated as appropriate: allergies, current medications, past family history, past medical history, past social history, past surgical history and problem list.    Subjective      Review of Systems:   Review of Systems - See HPI    Past Medical History:   Past Medical History:   Diagnosis Date    Allergic 1970    I had allergies most of my life    GERD (gastroesophageal reflux disease) 2020    Meds helps    Hypertension 3/15/2010    Date is approx    Obesity 2005    Ever since menapose       Past Surgical History:   Past Surgical History:   Procedure Laterality Date    COLONOSCOPY  3/27/22    Polyps  removed non cancerous       Family History:   Family History   Problem Relation Age of Onset    Breast cancer Mother 31    Cancer Mother         She several yrs    Ovarian cancer Neg Hx        Social History:   Social History     Socioeconomic History    Marital status:    Tobacco Use    Smoking status: Former     Current packs/day: 0.00     Average packs/day: 1 pack/day for 11.0 years (11.0 ttl pk-yrs)     Types: Cigarettes     Start date: 1974     Quit date: 1985     Years since quittin.2    Smokeless tobacco: Never    Tobacco comments:     Quit several urs and haven’t smoked since   Vaping Use    Vaping status: Never Used   Substance and Sexual Activity    Alcohol use: Yes      "Alcohol/week: 1.0 standard drink of alcohol     Types: 1 Drinks containing 0.5 oz of alcohol per week     Comment: I drink occasional glassof wine    Drug use: Never    Sexual activity: Not Currently     Partners: Male     Birth control/protection: Pill     Comment: Rain       Tobacco History:   Social History     Tobacco Use   Smoking Status Former    Current packs/day: 0.00    Average packs/day: 1 pack/day for 11.0 years (11.0 ttl pk-yrs)    Types: Cigarettes    Start date: 1974    Quit date: 1985    Years since quittin.2   Smokeless Tobacco Never   Tobacco Comments    Quit several urs and haven’t smoked since       Medications:     Current Outpatient Medications:     dicyclomine (BENTYL) 20 MG tablet, Take 1 tablet by mouth Every 6 (Six) Hours., Disp: 30 tablet, Rfl: 0    fluticasone (FLONASE) 50 MCG/ACT nasal spray, SPRAY 1 SPRAY INTO THE NOSTRILS DAILY AS DIRECTED BY PROVIDER, Disp: 48 mL, Rfl: 2    Loratadine 10 MG capsule, Take 1 capsule by mouth Daily., Disp: , Rfl:     losartan (COZAAR) 50 MG tablet, TAKE 1 TABLET BY MOUTH EVERY DAY, Disp: 90 tablet, Rfl: 0    simethicone (MYLICON,GAS-X) 125 MG capsule capsule, Take 1 capsule by mouth Every 6 (Six) Hours As Needed for Flatulence., Disp: 30 capsule, Rfl: 0    Vitamin D, Cholecalciferol, 50 MCG (2000 UT) capsule, Take 1 capsule by mouth Daily. Indications: Vitamin D Deficiency, Disp: , Rfl:     methylPREDNISolone (MEDROL) 4 MG dose pack, Take as directed on package instructions., Disp: 21 each, Rfl: 0    Omeprazole 20 MG tablet delayed-release, Take 20 mg by mouth Daily., Disp: 90 each, Rfl: 1    Allergies:   No Known Allergies    Objective   Objective     Physical Exam:  Vital Signs:   Vitals:    24 1245   BP: 140/80   Pulse: 83   Resp: 16   SpO2: 97%   Weight: 106 kg (232 lb 12.8 oz)   Height: 153.7 cm (60.5\")     Body mass index is 44.72 kg/m².     Physical Exam  Nursing note reviewed  Const: NAD, A&Ox4, Pleasant, Cooperative  Eyes: " EOMI, no conjunctivitis  ENT: No nasal discharge present, neck supple  Cardiac: Regular rate and rhythm, no cyanosis  Resp: Respiratory rate within normal limits, no increased work of breathing, no audible wheezing or retractions noted  GI: No distention or ascites  MSK: Motor and sensation grossly intact in bilateral upper extremities  Neurologic: CN II-XII grossly intact  Psych: Appropriate mood and behavior.  Skin: Warm, dry  Procedures/Radiology     Procedures  No radiology results for the last 7 days     Assessment & Plan   Assessment / Plan      Assessment/Plan:   Problems Addressed This Visit  Diagnoses and all orders for this visit:    1. Primary hypertension (Primary)  Assessment & Plan:  Fasting BMP today. Refill of losartan 50mg sent to pharmacy today. Goal <135/80.  Lifestyle changes, diet, increased exercise.      2. Bilateral otitis media with effusion  -     methylPREDNISolone (MEDROL) 4 MG dose pack; Take as directed on package instructions.  Dispense: 21 each; Refill: 0    3. Subclinical hypothyroidism  -     TSH Rfx On Abnormal To Free T4; Future    4. Environmental allergies  -     Ambulatory Referral to Allergy  -     methylPREDNISolone (MEDROL) 4 MG dose pack; Take as directed on package instructions.  Dispense: 21 each; Refill: 0      Problem List Items Addressed This Visit          Cardiac and Vasculature    Primary hypertension - Primary    Overview     Losartan 50 mg daily         Current Assessment & Plan     Fasting BMP today. Refill of losartan 50mg sent to pharmacy today. Goal <135/80.  Lifestyle changes, diet, increased exercise.            Symptoms and Signs    Elevated TSH     Other Visit Diagnoses       Bilateral otitis media with effusion        Relevant Medications    methylPREDNISolone (MEDROL) 4 MG dose pack    Environmental allergies        Relevant Medications    methylPREDNISolone (MEDROL) 4 MG dose pack    Other Relevant Orders    Ambulatory Referral to Allergy (Completed)               There are no Patient Instructions on file for this visit.    Follow Up:   Return in about 6 months (around 1/27/2025) for Medicare Wellness.        DO SHANELLE Chapman RD  [facility]  [address]  [facility] [address]  Fax [phone]  Phone [phone]

## 2024-07-19 LAB — T4 FREE SERPL-MCNC: 1.33 NG/DL (ref 0.92–1.68)

## 2024-07-25 ENCOUNTER — HOSPITAL ENCOUNTER (OUTPATIENT)
Dept: MAMMOGRAPHY | Facility: HOSPITAL | Age: 69
Discharge: HOME | End: 2024-07-25
Admitting: FAMILY MEDICINE
Payer: MEDICARE

## 2024-07-25 DIAGNOSIS — Z12.31 BREAST CANCER SCREENING BY MAMMOGRAM: ICD-10-CM

## 2024-07-25 LAB
NCCN CRITERIA FLAG: ABNORMAL
TYRER CUZICK SCORE: 9

## 2024-07-25 PROCEDURE — 77067 SCR MAMMO BI INCL CAD: CPT

## 2024-07-25 PROCEDURE — 77063 BREAST TOMOSYNTHESIS BI: CPT

## 2024-07-31 DIAGNOSIS — Z13.79 GENETIC TESTING: Primary | ICD-10-CM

## 2024-09-06 ENCOUNTER — TRANSCRIBE ORDERS (OUTPATIENT)
Dept: LAB | Facility: HOSPITAL | Age: 69
End: 2024-09-06
Payer: MEDICARE

## 2024-09-06 ENCOUNTER — LAB (OUTPATIENT)
Dept: LAB | Facility: HOSPITAL | Age: 69
End: 2024-09-06
Payer: MEDICARE

## 2024-09-06 ENCOUNTER — APPOINTMENT (OUTPATIENT)
Dept: LAB | Facility: HOSPITAL | Age: 69
End: 2024-09-06
Payer: MEDICARE

## 2024-09-06 DIAGNOSIS — T78.1XXA OTHER ADVERSE FOOD REACTIONS, NOT ELSEWHERE CLASSIFIED, INITIAL ENCOUNTER: Primary | ICD-10-CM

## 2024-09-06 DIAGNOSIS — Z13.79 GENETIC TESTING: ICD-10-CM

## 2024-09-06 DIAGNOSIS — T78.1XXA OTHER ADVERSE FOOD REACTIONS, NOT ELSEWHERE CLASSIFIED, INITIAL ENCOUNTER: ICD-10-CM

## 2024-09-06 PROCEDURE — 86008 ALLG SPEC IGE RECOMB EA: CPT

## 2024-09-06 PROCEDURE — 36415 COLL VENOUS BLD VENIPUNCTURE: CPT

## 2024-09-11 LAB
CONV CLASS DESCRIPTION: NORMAL
PEANUT (RARA H) 1 IGE QN: <0.1 KU/L
PEANUT (RARA H) 2 IGE QN: <0.1 KU/L
PEANUT (RARA H) 3 IGE QN: <0.1 KU/L
PEANUT (RARA H) 6 IGE QN: <0.1 KU/L
PEANUT (RARA H) 8 IGE QN: <0.1 KU/L
PEANUT (RARA H) 9 IGE QN: <0.1 KU/L

## 2024-09-17 LAB
ALLELIC STATE: NORMAL
AMBRY INTERPRETATION REPORT: NORMAL
AMINO ACID CHANGE: NORMAL
CHROMOSOME BLD/T: 11
DNA CHANGE: NORMAL
GEN ALLELE LOC ID: NORMAL
GENE DIS ANL INTERP-IMP: NORMAL
GENE DIS SEQ VAR INTERP-IMP: NORMAL
GENE STUDIED ID: NORMAL
GENETIC VAR ASSESS: PRESENT
GENOMIC SOURCE CLASS: NORMAL
HUMAN REF SEQ ASSEMBLY+BUILD: NORMAL
SIMPLE VAR ID: NORMAL
SIMPLE VAR ID: NORMAL
TRANSCRIPT REF SEQUENCE ID: NORMAL
VARIANT CATEGORY: NORMAL

## 2024-09-27 DIAGNOSIS — I10 PRIMARY HYPERTENSION: ICD-10-CM

## 2024-09-30 RX ORDER — LOSARTAN POTASSIUM 50 MG/1
50 TABLET ORAL DAILY
Qty: 90 TABLET | Refills: 0 | Status: SHIPPED | OUTPATIENT
Start: 2024-09-30

## 2024-09-30 NOTE — TELEPHONE ENCOUNTER
Rx Refill Note  Requested Prescriptions     Pending Prescriptions Disp Refills    losartan (COZAAR) 50 MG tablet [Pharmacy Med Name: LOSARTAN POTASSIUM 50 MG TAB] 90 tablet 0     Sig: TAKE 1 TABLET BY MOUTH EVERY DAY      Last office visit with prescribing clinician: 7/18/2024   Last telemedicine visit with prescribing clinician: Visit date not found   Next office visit with prescribing clinician: 1/30/2025                         Would you like a call back once the refill request has been completed: [] Yes [] No    If the office needs to give you a call back, can they leave a voicemail: [] Yes [] No    Magali Mcbride MA  09/30/24, 11:13 EDT

## 2024-10-28 ENCOUNTER — OFFICE VISIT (OUTPATIENT)
Dept: FAMILY MEDICINE CLINIC | Facility: CLINIC | Age: 69
End: 2024-10-28
Payer: MEDICARE

## 2024-10-28 VITALS
SYSTOLIC BLOOD PRESSURE: 124 MMHG | WEIGHT: 227.6 LBS | HEART RATE: 66 BPM | HEIGHT: 61 IN | TEMPERATURE: 97.7 F | DIASTOLIC BLOOD PRESSURE: 70 MMHG | BODY MASS INDEX: 42.97 KG/M2 | OXYGEN SATURATION: 96 %

## 2024-10-28 DIAGNOSIS — Z80.3 FH: BREAST CANCER IN RELATIVE WHEN <45 YEARS OLD: ICD-10-CM

## 2024-10-28 DIAGNOSIS — Z20.818 EXPOSURE TO STREP THROAT: ICD-10-CM

## 2024-10-28 DIAGNOSIS — J02.9 SORE THROAT: Primary | ICD-10-CM

## 2024-10-28 LAB
EXPIRATION DATE: NORMAL
INTERNAL CONTROL: NORMAL
Lab: NORMAL
S PYO AG THROAT QL: NEGATIVE

## 2024-10-28 PROCEDURE — 3078F DIAST BP <80 MM HG: CPT | Performed by: FAMILY MEDICINE

## 2024-10-28 PROCEDURE — 99214 OFFICE O/P EST MOD 30 MIN: CPT | Performed by: FAMILY MEDICINE

## 2024-10-28 PROCEDURE — 1125F AMNT PAIN NOTED PAIN PRSNT: CPT | Performed by: FAMILY MEDICINE

## 2024-10-28 PROCEDURE — 3074F SYST BP LT 130 MM HG: CPT | Performed by: FAMILY MEDICINE

## 2024-10-28 PROCEDURE — 87880 STREP A ASSAY W/OPTIC: CPT | Performed by: FAMILY MEDICINE

## 2024-10-28 RX ORDER — IPRATROPIUM BROMIDE 42 UG/1
SPRAY, METERED NASAL
COMMUNITY
Start: 2024-09-04

## 2024-10-28 NOTE — LETTER
October 28, 2024     Patient: Stella Lopez   YOB: 1955   Date of Visit: 10/28/2024       To Whom It May Concern:    It is my medical opinion that Stella Lopez is high-risk for development of breast cancer due to a significant family history of breast cancer in a first-degree relative before age 35. She would benefit from genetic screening for risk stratification and further management.           Sincerely,        Silvino Carmona,     CC: No Recipients

## 2024-11-11 ENCOUNTER — OFFICE VISIT (OUTPATIENT)
Dept: FAMILY MEDICINE CLINIC | Facility: CLINIC | Age: 69
End: 2024-11-11
Payer: MEDICARE

## 2024-11-11 VITALS
HEIGHT: 61 IN | BODY MASS INDEX: 42.82 KG/M2 | SYSTOLIC BLOOD PRESSURE: 142 MMHG | OXYGEN SATURATION: 97 % | WEIGHT: 226.8 LBS | DIASTOLIC BLOOD PRESSURE: 80 MMHG | HEART RATE: 80 BPM

## 2024-11-11 DIAGNOSIS — J02.9 SORE THROAT: ICD-10-CM

## 2024-11-11 DIAGNOSIS — R05.9 COUGH, UNSPECIFIED TYPE: Primary | ICD-10-CM

## 2024-11-11 LAB
EXPIRATION DATE: NORMAL
EXPIRATION DATE: NORMAL
FLUAV AG UPPER RESP QL IA.RAPID: NOT DETECTED
FLUBV AG UPPER RESP QL IA.RAPID: NOT DETECTED
INTERNAL CONTROL: NORMAL
INTERNAL CONTROL: NORMAL
Lab: NORMAL
Lab: NORMAL
S PYO AG THROAT QL: NEGATIVE
SARS-COV-2 AG UPPER RESP QL IA.RAPID: NOT DETECTED

## 2024-11-11 PROCEDURE — 99214 OFFICE O/P EST MOD 30 MIN: CPT | Performed by: FAMILY MEDICINE

## 2024-11-11 PROCEDURE — 87880 STREP A ASSAY W/OPTIC: CPT | Performed by: FAMILY MEDICINE

## 2024-11-11 RX ORDER — ALBUTEROL SULFATE 90 UG/1
2 INHALANT RESPIRATORY (INHALATION) EVERY 4 HOURS PRN
Qty: 8 G | Refills: 1 | Status: SHIPPED | OUTPATIENT
Start: 2024-11-11

## 2024-11-11 RX ORDER — CEFDINIR 300 MG/1
300 CAPSULE ORAL 2 TIMES DAILY
Qty: 20 CAPSULE | Refills: 0 | Status: SHIPPED | OUTPATIENT
Start: 2024-11-11 | End: 2024-11-21

## 2024-11-11 NOTE — PROGRESS NOTES
Established Patient Office Visit      Patient Name: Stella Lopez  : 1955   MRN: [id number]   Care Team: Patient Care Team:  Silvino Carmona DO as PCP - General (Family Medicine)    Chief Complaint:    Chief Complaint   Patient presents with    Sore Throat     X 2 days    Cough       History of Present Illness: Stella Lopez is a 69 y.o. female who is here today for chief complaint.    Sore Throat   This is a new problem. The current episode started yesterday. The problem has been coming and going.   Pain is worse on both side(s). There has been no fever. The pain is at a severity of 8/10. Associated symptoms include congestion, coughing, ear pain, a hoarse voice and trouble swallowing. Pertinent negatives include no abdominal pain, diarrhea, drooling, headaches, neck pain, shortness of breath, swollen glands or vomiting.   Additional comments: No to all above      She was seen 2 weeks ago for same, at that point her sore throat had been going on for about 2 weeks.    This patient is accompanied by their self who contributes to the history of their care.    The following portions of the patient's history were reviewed and updated as appropriate: allergies, current medications, past family history, past medical history, past social history, past surgical history and problem list.    Subjective      Review of Systems:   Review of Systems   HENT:  Positive for congestion, ear pain, hoarse voice, sore throat and trouble swallowing. Negative for drooling and swollen glands.    Respiratory:  Positive for cough. Negative for shortness of breath.    Gastrointestinal:  Negative for abdominal pain, diarrhea and vomiting.   Musculoskeletal:  Negative for neck pain.    - See HPI    Past Medical History:   Past Medical History:   Diagnosis Date    Allergic 1970    I had allergies most of my life    GERD (gastroesophageal reflux disease) 2020    Meds helps    Hypertension 3/15/2010    Date is  approx    Obesity 2005    Ever since menapose       Past Surgical History:   Past Surgical History:   Procedure Laterality Date    COLONOSCOPY  3/27/22    Polyps  removed non cancerous       Family History:   Family History   Problem Relation Age of Onset    Breast cancer Mother 31    Cancer Mother         She  had breast and passed away      Breast cancer Paternal Aunt     Ovarian cancer Neg Hx        Social History:   Social History     Socioeconomic History    Marital status:    Tobacco Use    Smoking status: Former     Current packs/day: 0.00     Average packs/day: 1 pack/day for 11.0 years (11.0 ttl pk-yrs)     Types: Cigarettes     Start date: 1974     Quit date: 1985     Years since quittin.5     Passive exposure: Never    Smokeless tobacco: Never    Tobacco comments:     Quit several yrs agoand haven't smoked since   Vaping Use    Vaping status: Never Used   Substance and Sexual Activity    Alcohol use: Yes     Alcohol/week: 1.0 standard drink of alcohol     Types: 1 Drinks containing 0.5 oz of alcohol per week     Comment: I drink occasional glassof wine    Drug use: Never    Sexual activity: Not Currently     Partners: Male     Birth control/protection: Pill     Comment: Na       Tobacco History:   Social History     Tobacco Use   Smoking Status Former    Current packs/day: 0.00    Average packs/day: 1 pack/day for 11.0 years (11.0 ttl pk-yrs)    Types: Cigarettes    Start date: 1974    Quit date: 1985    Years since quittin.5    Passive exposure: Never   Smokeless Tobacco Never   Tobacco Comments    Quit several yrs agoand haven't smoked since       Medications:   Outpatient Medications Prior to Visit   Medication Sig Dispense Refill    fluticasone (FLONASE) 50 MCG/ACT nasal spray SPRAY 1 SPRAY INTO THE NOSTRILS DAILY AS DIRECTED BY PROVIDER 48 mL 2    ipratropium (ATROVENT) 0.06 % nasal spray INSTILL 2 SPRAYS INTO EACH NOSTRIL EVERY 3-4 HOURS AS NEEDED.       "Loratadine 10 MG capsule Take 1 capsule by mouth Daily.      losartan (COZAAR) 50 MG tablet TAKE 1 TABLET BY MOUTH EVERY DAY 90 tablet 0    simethicone (MYLICON,GAS-X) 125 MG capsule capsule Take 1 capsule by mouth Every 6 (Six) Hours As Needed for Flatulence. 30 capsule 0    Vitamin D, Cholecalciferol, 50 MCG (2000 UT) capsule Take 1 capsule by mouth Daily. Indications: Vitamin D Deficiency       No facility-administered medications prior to visit.        Allergies:   No Known Allergies    Objective   Objective     Physical Exam:  Vital Signs:   Vitals:    11/11/24 1241   BP: 142/80   Pulse: 80   SpO2: 97%   Weight: 103 kg (226 lb 12.8 oz)   Height: 153.7 cm (60.51\")   PainSc:   8   PainLoc: Generalized     Body mass index is 43.55 kg/m².     Physical Exam  Nursing note reviewed  Const: NAD, A&Ox4, Pleasant, Cooperative  Eyes: EOMI, no conjunctivitis  ENT: No nasal discharge present, neck supple  Cardiac: Regular rate and rhythm, no cyanosis  Resp: Respiratory rate within normal limits, no increased work of breathing, no audible wheezing or retractions noted  GI: No distention or ascites  MSK: Motor and sensation grossly intact in bilateral upper extremities  Neurologic: CN II-XII grossly intact  Psych: Appropriate mood and behavior.  Skin: Warm, dry  Procedures/Radiology     Procedures  No radiology results for the last 7 days     Assessment & Plan   Assessment / Plan      Assessment/Plan:   Problems Addressed This Visit  Diagnoses and all orders for this visit:    1. Cough, unspecified type (Primary)  -     POCT SARS-CoV-2 Antigen DYLON + Flu  -     cefdinir (OMNICEF) 300 MG capsule; Take 1 capsule by mouth 2 (Two) Times a Day for 10 days.  Dispense: 20 capsule; Refill: 0  -     albuterol sulfate  (90 Base) MCG/ACT inhaler; Inhale 2 puffs Every 4 (Four) Hours As Needed for Wheezing or Shortness of Air.  Dispense: 8 g; Refill: 1    2. Sore throat  -     POCT rapid strep A  -     cefdinir (OMNICEF) 300 MG " capsule; Take 1 capsule by mouth 2 (Two) Times a Day for 10 days.  Dispense: 20 capsule; Refill: 0      Problem List Items Addressed This Visit    None  Visit Diagnoses       Cough, unspecified type    -  Primary    Relevant Medications    albuterol sulfate  (90 Base) MCG/ACT inhaler    Other Relevant Orders    POCT SARS-CoV-2 Antigen DYLON + Flu (Completed)    Sore throat        Relevant Orders    POCT rapid strep A (Completed)          Orders Placed This Encounter   Procedures    POCT SARS-CoV-2 Antigen DYLON + Flu     Order Specific Question:   Release to patient     Answer:   Routine Release [[id number]]    POCT rapid strep A     Order Specific Question:   Release to patient     Answer:   Routine Release [[id number]]         There are no Patient Instructions on file for this visit.    Follow Up:   No follow-ups on file.    DO SHANELLE Chapman RD  [facility]  [address] [facility] [address]  Fax [phone]  Phone [phone]    Disclaimer to patients: The 21[employer] Century [employer] Act makes medical notes like these available to patients in the interest of transparency. However, please be advised that this is still a medical document. It is intended as [id number] communication. Many sections may include medical language or jargon, abbreviations, and additional verbiage that are unfamiliar or confusing. In some ways it may come across as blunt, direct, or may be summarized in order to clearly and concisely communicate the most crucial information to medical professionals. It may also include mentions of conditions that are unlikely but considered as part of the differential diagnosis, including serious disorders. These are not always discussed at length at the time of appointment because their likelihood is so low, but may be included in a medical note to make it clear what has been considered and/or ruled out as part of a work-up.  Medical documents are intended to carry relevant information, facts as evident, and the personal clinical opinion of the physician. If you have any questions regarding this medical document, please bring them to the attention of the physician at your next scheduled appointment.

## 2024-11-11 NOTE — PROGRESS NOTES
Subjective   Stella Lopez is a 69 y.o. female.     Chief Complaint   Patient presents with    Sore Throat     X 3 weeks. Pt. States she was exposed to Strep Last Week.    Dry Mouth       Sore Throat   This is a new problem. The current episode started 1 to 4 weeks ago. The problem has been coming and going.   Pain is worse on both side(s). There has been no fever. The pain is at a severity of 8/10. Associated symptoms include congestion, coughing, ear pain and trouble swallowing. Pertinent negatives include no abdominal pain, diarrhea, drooling, headaches, hoarse voice, neck pain, shortness of breath or vomiting.        Stella Lopez presents today for   Chief Complaint   Patient presents with    Sore Throat     X 3 weeks. Pt. States she was exposed to Strep Last Week.    Dry Mouth       she reports symptoms ongoing for 3 weeks.  she has tried over-the-counter medications with minimal improvement.  she has tried rest and fluids. she has noticed associated symptoms of congestion and cough as well as a mild subjective fever. she feels as though her symptoms are worsening. she reports some possible sick contacts but none confirmed.    The following portions of the patient's history were reviewed and updated as appropriate: allergies, current medications, past family history, past medical history, past social history, past surgical history and problem list.    Active Ambulatory Problems     Diagnosis Date Noted    Primary hypertension 06/14/2023    Vitamin D deficiency 06/14/2023    Elevated TSH 07/18/2024     Resolved Ambulatory Problems     Diagnosis Date Noted    No Resolved Ambulatory Problems     Past Medical History:   Diagnosis Date    Allergic 9/01/1970    GERD (gastroesophageal reflux disease) 1/22/2020    Hypertension 3/15/2010    Obesity 5/12/2005     Past Surgical History:   Procedure Laterality Date    COLONOSCOPY  3/27/22    Polyps  removed non cancerous     Family History   Problem Relation Age  of Onset    Breast cancer Mother 31    Cancer Mother         She  had breast and passed away      Breast cancer Paternal Aunt     Ovarian cancer Neg Hx      Social History     Socioeconomic History    Marital status:    Tobacco Use    Smoking status: Former     Current packs/day: 0.00     Average packs/day: 1 pack/day for 11.0 years (11.0 ttl pk-yrs)     Types: Cigarettes     Start date: 1974     Quit date: 1985     Years since quittin.5     Passive exposure: Never    Smokeless tobacco: Never    Tobacco comments:     Quit several yrs agoand haven’t smoked since   Vaping Use    Vaping status: Never Used   Substance and Sexual Activity    Alcohol use: Yes     Alcohol/week: 1.0 standard drink of alcohol     Types: 1 Drinks containing 0.5 oz of alcohol per week     Comment: I drink occasional glassof wine    Drug use: Never    Sexual activity: Not Currently     Partners: Male     Birth control/protection: Pill     Comment: Na       Review of Systems   HENT:  Positive for congestion, ear pain, sore throat and trouble swallowing. Negative for drooling and hoarse voice.    Respiratory:  Positive for cough. Negative for shortness of breath.    Gastrointestinal:  Negative for abdominal pain, diarrhea and vomiting.   Musculoskeletal:  Negative for neck pain.   Neurological:  Negative for headaches.     Review of Systems -   General ROS: positive for  - fever and malaise  negative for - hot flashes, night sweats or weight loss  ENT ROS: positive for - headaches, nasal congestion, nasal discharge, sinus pain and sore throat  negative for - epistaxis, oral lesions, tinnitus or visual changes  Respiratory ROS: positive for - cough and sputum changes  negative for - hemoptysis, pleuritic pain, shortness of breath or wheezing  Cardiovascular ROS: no chest pain or dyspnea on exertion    Objective   Blood pressure 124/70, pulse 66, temperature 97.7 °F (36.5 °C), temperature source Oral, height 153.7 cm  "(60.51\"), weight 103 kg (227 lb 9.6 oz), SpO2 96%.  Nursing note reviewed  Physical Exam  Const: Mildly ill-appearing but in no acute distress, A&Ox4, Pleasant, Cooperative  Eyes: EOMI, no conjunctivitis  ENT: There is moderate nasal discharge present, nasal congestion noted.  There is slight congestion of the mucous membranes.  There is mild submandibular and anterior cervical lymphadenopathy consistent with symptoms.  Cardiac: Regular rate and rhythm, no cyanosis  Resp: Respiratory rate within normal limits, no increased work of breathing, no audible wheezing or retractions noted.  There are slight rhonchi noted, a cough is appreciated sporadically.  GI: No distention or ascites  Psych: Appropriate mood and behavior.  Skin: Warm, dry  Procedures  Assessment & Plan     Problem List Items Addressed This Visit    None  Visit Diagnoses       Sore throat    -  Primary    Relevant Orders    POCT rapid strep A (Completed)    Exposure to strep throat        FH: breast cancer in relative when <45 years old        Mother at age 31. Patient had genetic testing completed, needs letter stating she is high risk.            Patient was encouraged to practice proper hygiene as well as use the symptomatic medications indicated above.  If no better they were encouraged to return to our office if needed.  Zinc lozenges may be effective in preventing the spread of viral upper respiratory infections including the common cold, and they were counseled on family member use and prophylactic use of these medications. she was encouraged to maintain adequate hydration with Pedialyte if possible.  They were cautioned on the risk of viral myocarditis, and encouraged to avoid exercise or significant exertion while febrile or while symptoms extend below the neck.    There are no Patient Instructions on file for this visit.     Ambulatory progress note signed and attested to by Silvino Carmona D.O.       Answers submitted by the patient for this " visit:  Primary Reason for Visit (Submitted on 10/27/2024)  What is the primary reason for your visit?: Sore Throat  Sore Throat Questionnaire (Submitted on 10/27/2024)  Chief Complaint: Sore throat  drainage: Yes  strep: Yes

## 2024-12-30 ENCOUNTER — OFFICE VISIT (OUTPATIENT)
Dept: FAMILY MEDICINE CLINIC | Facility: CLINIC | Age: 69
End: 2024-12-30
Payer: MEDICARE

## 2024-12-30 VITALS
SYSTOLIC BLOOD PRESSURE: 126 MMHG | OXYGEN SATURATION: 94 % | BODY MASS INDEX: 43.58 KG/M2 | DIASTOLIC BLOOD PRESSURE: 84 MMHG | HEART RATE: 79 BPM | HEIGHT: 61 IN | WEIGHT: 230.8 LBS

## 2024-12-30 DIAGNOSIS — R05.1 ACUTE COUGH: Primary | ICD-10-CM

## 2024-12-30 LAB
EXPIRATION DATE: NORMAL
FLUAV AG UPPER RESP QL IA.RAPID: NOT DETECTED
FLUBV AG UPPER RESP QL IA.RAPID: NOT DETECTED
INTERNAL CONTROL: NORMAL
Lab: NORMAL
SARS-COV-2 AG UPPER RESP QL IA.RAPID: NOT DETECTED

## 2024-12-30 PROCEDURE — 99214 OFFICE O/P EST MOD 30 MIN: CPT | Performed by: FAMILY MEDICINE

## 2024-12-30 PROCEDURE — 1125F AMNT PAIN NOTED PAIN PRSNT: CPT | Performed by: FAMILY MEDICINE

## 2024-12-30 PROCEDURE — 3074F SYST BP LT 130 MM HG: CPT | Performed by: FAMILY MEDICINE

## 2024-12-30 PROCEDURE — 87428 SARSCOV & INF VIR A&B AG IA: CPT | Performed by: FAMILY MEDICINE

## 2024-12-30 PROCEDURE — 3079F DIAST BP 80-89 MM HG: CPT | Performed by: FAMILY MEDICINE

## 2024-12-30 RX ORDER — ALBUTEROL SULFATE 90 UG/1
2 INHALANT RESPIRATORY (INHALATION) EVERY 4 HOURS PRN
Qty: 8 G | Refills: 1 | Status: SHIPPED | OUTPATIENT
Start: 2024-12-30

## 2024-12-30 RX ORDER — CEFDINIR 300 MG/1
300 CAPSULE ORAL 2 TIMES DAILY
Qty: 20 CAPSULE | Refills: 0 | Status: SHIPPED | OUTPATIENT
Start: 2024-12-30 | End: 2025-01-09

## 2024-12-30 RX ORDER — FEXOFENADINE HCL 180 MG/1
180 TABLET ORAL DAILY
Qty: 90 TABLET | Refills: 3 | Status: SHIPPED | OUTPATIENT
Start: 2024-12-30

## 2024-12-30 NOTE — PATIENT INSTRUCTIONS
I would like you to have your labs done about a week prior to your next appointment.  You do not need an appointment, but I would advise to call our office a few days before you plan to have your labs done to confirm that orders are in and active.  We will discuss the results at your next scheduled visit.  -Lab services are available at any [facility] outpatient lab center, including across the street at [address] [place] [address]     If not improving, have CXR completed

## 2024-12-31 DIAGNOSIS — I10 PRIMARY HYPERTENSION: ICD-10-CM

## 2024-12-31 RX ORDER — FLUTICASONE PROPIONATE 50 MCG
1 SPRAY, SUSPENSION (ML) NASAL DAILY
Qty: 16 G | Refills: 11 | Status: SHIPPED | OUTPATIENT
Start: 2024-12-31

## 2024-12-31 RX ORDER — LOSARTAN POTASSIUM 50 MG/1
50 TABLET ORAL DAILY
Qty: 90 TABLET | Refills: 0 | Status: SHIPPED | OUTPATIENT
Start: 2024-12-31

## 2024-12-31 NOTE — TELEPHONE ENCOUNTER
Rx Refill Note  Requested Prescriptions     Pending Prescriptions Disp Refills    fluticasone (FLONASE) 50 MCG/ACT nasal spray        Last office visit with prescribing clinician: 12/30/2024   Last telemedicine visit with prescribing clinician: Visit date not found   Next office visit with prescribing clinician: 12/31/2024                         Would you like a call back once the refill request has been completed: [] Yes [] No    If the office needs to give you a call back, can they leave a voicemail: [] Yes [] No    Jacqueline Chambers MA  12/31/24, 09:59 EST

## 2025-01-28 ENCOUNTER — LAB (OUTPATIENT)
Dept: LAB | Facility: HOSPITAL | Age: 70
End: 2025-01-28
Payer: MEDICARE

## 2025-01-28 DIAGNOSIS — Z13.0 SCREENING FOR DEFICIENCY ANEMIA: ICD-10-CM

## 2025-01-28 DIAGNOSIS — Z13.220 SCREENING FOR HYPERLIPIDEMIA: ICD-10-CM

## 2025-01-28 DIAGNOSIS — E55.9 VITAMIN D DEFICIENCY: ICD-10-CM

## 2025-01-28 DIAGNOSIS — Z13.89 SCREENING FOR BLOOD OR PROTEIN IN URINE: ICD-10-CM

## 2025-01-28 DIAGNOSIS — Z13.6 SCREENING FOR CARDIOVASCULAR CONDITION: ICD-10-CM

## 2025-01-28 DIAGNOSIS — Z00.00 PREVENTATIVE HEALTH CARE: ICD-10-CM

## 2025-01-28 DIAGNOSIS — Z13.29 SCREENING FOR ENDOCRINE DISORDER: ICD-10-CM

## 2025-01-28 DIAGNOSIS — Z13.220 SCREENING FOR HYPERLIPIDEMIA: Primary | ICD-10-CM

## 2025-01-28 LAB
BILIRUB UR QL STRIP: NEGATIVE
CLARITY UR: CLEAR
COLOR UR: YELLOW
GLUCOSE UR STRIP-MCNC: NEGATIVE MG/DL
HGB UR QL STRIP.AUTO: NEGATIVE
KETONES UR QL STRIP: NEGATIVE
LEUKOCYTE ESTERASE UR QL STRIP.AUTO: NEGATIVE
NITRITE UR QL STRIP: NEGATIVE
PH UR STRIP.AUTO: 7 [PH] (ref 5–8)
PROT UR QL STRIP: NEGATIVE
SP GR UR STRIP: 1.01 (ref 1–1.03)
UROBILINOGEN UR QL STRIP: NORMAL

## 2025-01-28 PROCEDURE — 80053 COMPREHEN METABOLIC PANEL: CPT

## 2025-01-28 PROCEDURE — 82306 VITAMIN D 25 HYDROXY: CPT

## 2025-01-28 PROCEDURE — 81003 URINALYSIS AUTO W/O SCOPE: CPT

## 2025-01-28 PROCEDURE — 86141 C-REACTIVE PROTEIN HS: CPT

## 2025-01-28 PROCEDURE — 85025 COMPLETE CBC W/AUTO DIFF WBC: CPT

## 2025-01-28 PROCEDURE — 84443 ASSAY THYROID STIM HORMONE: CPT

## 2025-01-28 PROCEDURE — 83036 HEMOGLOBIN GLYCOSYLATED A1C: CPT

## 2025-01-28 PROCEDURE — 84439 ASSAY OF FREE THYROXINE: CPT

## 2025-01-28 PROCEDURE — 80061 LIPID PANEL: CPT

## 2025-01-29 LAB
25(OH)D3 SERPL-MCNC: 46.6 NG/ML (ref 30–100)
ALBUMIN SERPL-MCNC: 4.3 G/DL (ref 3.5–5.2)
ALBUMIN/GLOB SERPL: 1.4 G/DL
ALP SERPL-CCNC: 102 U/L (ref 39–117)
ALT SERPL W P-5'-P-CCNC: 10 U/L (ref 1–33)
ANION GAP SERPL CALCULATED.3IONS-SCNC: 13 MMOL/L (ref 5–15)
AST SERPL-CCNC: 15 U/L (ref 1–32)
BASOPHILS # BLD AUTO: 0.05 10*3/MM3 (ref 0–0.2)
BASOPHILS NFR BLD AUTO: 0.5 % (ref 0–1.5)
BILIRUB SERPL-MCNC: 0.4 MG/DL (ref 0–1.2)
BUN SERPL-MCNC: 15 MG/DL (ref 8–23)
BUN/CREAT SERPL: 17.9 (ref 7–25)
CALCIUM SPEC-SCNC: 9.6 MG/DL (ref 8.6–10.5)
CHLORIDE SERPL-SCNC: 100 MMOL/L (ref 98–107)
CHOLEST SERPL-MCNC: 201 MG/DL (ref 0–200)
CO2 SERPL-SCNC: 24 MMOL/L (ref 22–29)
CREAT SERPL-MCNC: 0.84 MG/DL (ref 0.57–1)
CRP SERPL-MCNC: 0.87 MG/DL (ref 0.01–0.5)
DEPRECATED RDW RBC AUTO: 42.5 FL (ref 37–54)
EGFRCR SERPLBLD CKD-EPI 2021: 75.8 ML/MIN/1.73
EOSINOPHIL # BLD AUTO: 0.33 10*3/MM3 (ref 0–0.4)
EOSINOPHIL NFR BLD AUTO: 3.5 % (ref 0.3–6.2)
ERYTHROCYTE [DISTWIDTH] IN BLOOD BY AUTOMATED COUNT: 11.8 % (ref 12.3–15.4)
GLOBULIN UR ELPH-MCNC: 3.1 GM/DL
GLUCOSE SERPL-MCNC: 82 MG/DL (ref 65–99)
HBA1C MFR BLD: 5.8 % (ref 4.8–5.6)
HCT VFR BLD AUTO: 39.7 % (ref 34–46.6)
HDLC SERPL-MCNC: 57 MG/DL (ref 40–60)
HGB BLD-MCNC: 13.3 G/DL (ref 12–15.9)
IMM GRANULOCYTES # BLD AUTO: 0.03 10*3/MM3 (ref 0–0.05)
IMM GRANULOCYTES NFR BLD AUTO: 0.3 % (ref 0–0.5)
LDLC SERPL CALC-MCNC: 119 MG/DL (ref 0–100)
LDLC/HDLC SERPL: 2.03 {RATIO}
LYMPHOCYTES # BLD AUTO: 3.45 10*3/MM3 (ref 0.7–3.1)
LYMPHOCYTES NFR BLD AUTO: 36.5 % (ref 19.6–45.3)
MCH RBC QN AUTO: 32.9 PG (ref 26.6–33)
MCHC RBC AUTO-ENTMCNC: 33.5 G/DL (ref 31.5–35.7)
MCV RBC AUTO: 98.3 FL (ref 79–97)
MONOCYTES # BLD AUTO: 0.61 10*3/MM3 (ref 0.1–0.9)
MONOCYTES NFR BLD AUTO: 6.5 % (ref 5–12)
NEUTROPHILS NFR BLD AUTO: 4.98 10*3/MM3 (ref 1.7–7)
NEUTROPHILS NFR BLD AUTO: 52.7 % (ref 42.7–76)
NRBC BLD AUTO-RTO: 0 /100 WBC (ref 0–0.2)
PLATELET # BLD AUTO: 394 10*3/MM3 (ref 140–450)
PMV BLD AUTO: 11.3 FL (ref 6–12)
POTASSIUM SERPL-SCNC: 4.2 MMOL/L (ref 3.5–5.2)
PROT SERPL-MCNC: 7.4 G/DL (ref 6–8.5)
RBC # BLD AUTO: 4.04 10*6/MM3 (ref 3.77–5.28)
SODIUM SERPL-SCNC: 137 MMOL/L (ref 136–145)
T4 FREE SERPL-MCNC: 1.32 NG/DL (ref 0.92–1.68)
TRIGL SERPL-MCNC: 141 MG/DL (ref 0–150)
TSH SERPL DL<=0.05 MIU/L-ACNC: 4.22 UIU/ML (ref 0.27–4.2)
VLDLC SERPL-MCNC: 25 MG/DL (ref 5–40)
WBC NRBC COR # BLD AUTO: 9.45 10*3/MM3 (ref 3.4–10.8)

## 2025-01-30 ENCOUNTER — OFFICE VISIT (OUTPATIENT)
Dept: FAMILY MEDICINE CLINIC | Facility: CLINIC | Age: 70
End: 2025-01-30
Payer: MEDICARE

## 2025-01-30 VITALS
HEIGHT: 61 IN | SYSTOLIC BLOOD PRESSURE: 118 MMHG | OXYGEN SATURATION: 98 % | BODY MASS INDEX: 43.88 KG/M2 | DIASTOLIC BLOOD PRESSURE: 76 MMHG | WEIGHT: 232.4 LBS | HEART RATE: 78 BPM

## 2025-01-30 DIAGNOSIS — E55.9 VITAMIN D DEFICIENCY: ICD-10-CM

## 2025-01-30 DIAGNOSIS — R79.82 ELEVATED C-REACTIVE PROTEIN (CRP): ICD-10-CM

## 2025-01-30 DIAGNOSIS — Z00.00 MEDICARE ANNUAL WELLNESS VISIT, SUBSEQUENT: Primary | ICD-10-CM

## 2025-01-30 DIAGNOSIS — I10 PRIMARY HYPERTENSION: ICD-10-CM

## 2025-01-30 PROCEDURE — 3074F SYST BP LT 130 MM HG: CPT | Performed by: FAMILY MEDICINE

## 2025-01-30 PROCEDURE — 1126F AMNT PAIN NOTED NONE PRSNT: CPT | Performed by: FAMILY MEDICINE

## 2025-01-30 PROCEDURE — 3078F DIAST BP <80 MM HG: CPT | Performed by: FAMILY MEDICINE

## 2025-01-30 PROCEDURE — G0439 PPPS, SUBSEQ VISIT: HCPCS | Performed by: FAMILY MEDICINE

## 2025-01-30 RX ORDER — CEFDINIR 300 MG/1
300 CAPSULE ORAL 2 TIMES DAILY
Qty: 10 CAPSULE | Refills: 0 | Status: SHIPPED | OUTPATIENT
Start: 2025-01-30 | End: 2025-02-04

## 2025-01-30 NOTE — PROGRESS NOTES
Subjective   The ABCs of the Annual Wellness Visit  Medicare Wellness Visit      Stella Lopez is a 69 y.o. patient who presents for a Medicare Wellness Visit.    The following portions of the patient's history were reviewed and   updated as appropriate: allergies, current medications, past family history, past medical history, past social history, past surgical history, and problem list.    Compared to one year ago, the patient's physical   health is the same.  Compared to one year ago, the patient's mental   health is the same.    Recent Hospitalizations:  She was not admitted to the hospital during the last year.     Current Medical Providers:  Patient Care Team:  Silvino Carmona DO as PCP - General (Family Medicine)    Outpatient Medications Prior to Visit   Medication Sig Dispense Refill    albuterol sulfate  (90 Base) MCG/ACT inhaler Inhale 2 puffs Every 4 (Four) Hours As Needed for Wheezing or Shortness of Air. 8 g 1    fexofenadine (Allegra Allergy) 180 MG tablet Take 1 tablet by mouth Daily. 90 tablet 3    fluticasone (FLONASE) 50 MCG/ACT nasal spray Administer 1 spray into the nostril(s) as directed by provider Daily. 16 g 11    ipratropium (ATROVENT) 0.06 % nasal spray INSTILL 2 SPRAYS INTO EACH NOSTRIL EVERY 3-4 HOURS AS NEEDED.      losartan (COZAAR) 50 MG tablet TAKE 1 TABLET BY MOUTH EVERY DAY 90 tablet 0    simethicone (MYLICON,GAS-X) 125 MG capsule capsule Take 1 capsule by mouth Every 6 (Six) Hours As Needed for Flatulence. 30 capsule 0    Vitamin D, Cholecalciferol, 50 MCG (2000 UT) capsule Take 1 capsule by mouth Daily. Indications: Vitamin D Deficiency       No facility-administered medications prior to visit.     No opioid medication identified on active medication list. I have reviewed chart for other potential  high risk medication/s and harmful drug interactions in the elderly.      Aspirin is not on active medication list.  Aspirin use is not indicated based on review of  "current medical condition/s. Risk of harm outweighs potential benefits.  .    Patient Active Problem List   Diagnosis    Primary hypertension    Vitamin D deficiency    Elevated TSH     Advance Care Planning Advance Directive is not on file.  ACP discussion was held with the patient during this visit. Patient has an advance directive (not in EMR), copy requested.            Objective   Vitals:    25 1025   BP: 118/76   Pulse: 78   SpO2: 98%   Weight: 105 kg (232 lb 6.4 oz)   Height: 153.7 cm (60.51\")   PainSc: 0-No pain       Estimated body mass index is 44.62 kg/m² as calculated from the following:    Height as of this encounter: 153.7 cm (60.51\").    Weight as of this encounter: 105 kg (232 lb 6.4 oz).    Class 3 Severe Obesity (BMI >=40). Obesity-related health conditions include the following: hypertension. Obesity is improving with treatment. BMI is is above average; BMI management plan is completed. We discussed portion control and increasing exercise.           Does the patient have evidence of cognitive impairment? No  Lab Results   Component Value Date    TRIG 141 2025    HDL 57 2025     (H) 2025    VLDL 25 2025    HGBA1C 5.80 (H) 2025                                                                                               Health  Risk Assessment    Smoking Status:  Social History     Tobacco Use   Smoking Status Former    Current packs/day: 0.00    Average packs/day: 1 pack/day for 11.0 years (11.0 ttl pk-yrs)    Types: Cigarettes    Start date: 1974    Quit date: 1985    Years since quittin.7    Passive exposure: Never   Smokeless Tobacco Never   Tobacco Comments    Quit several yrs agoand haven’t smoked since     Alcohol Consumption:  Social History     Substance and Sexual Activity   Alcohol Use Yes    Alcohol/week: 1.0 standard drink of alcohol    Types: 1 Drinks containing 0.5 oz of alcohol per week    Comment: Occasionally       Fall Risk " Screen  STEADI Fall Risk Assessment was completed, and patient is at LOW risk for falls.Assessment completed on:2025    Depression Screening   Little interest or pleasure in doing things? Not at all   Feeling down, depressed, or hopeless? Not at all   PHQ-2 Total Score 0      Health Habits and Functional and Cognitive Screenin/23/2025     8:56 AM   Functional & Cognitive Status   Do you have difficulty preparing food and eating? No    Do you have difficulty bathing yourself, getting dressed or grooming yourself? No    Do you have difficulty using the toilet? No    Do you have difficulty moving around from place to place? No    Do you have trouble with steps or getting out of a bed or a chair? No    Current Diet Well Balanced Diet    Dental Exam Up to date    Eye Exam Up to date    Exercise (times per week) 3 times per week    Current Exercises Include Gardening;House Cleaning;Light Weights;Walking    Do you need help using the phone?  No    Are you deaf or do you have serious difficulty hearing?  No    Do you need help to go to places out of walking distance? No    Do you need help shopping? No    Do you need help preparing meals?  No    Do you need help with housework?  No    Do you need help with laundry? No    Do you need help taking your medications? No    Do you need help managing money? No    Do you ever drive or ride in a car without wearing a seat belt? No    Have you felt unusual stress, anger or loneliness in the last month? No    Who do you live with? Spouse    If you need help, do you have trouble finding someone available to you? No    Have you been bothered in the last four weeks by sexual problems? No    Do you have difficulty concentrating, remembering or making decisions? No        Patient-reported           Age-appropriate Screening Schedule:  Refer to the list below for future screening recommendations based on patient's age, sex and/or medical conditions. Orders for these  recommended tests are listed in the plan section. The patient has been provided with a written plan.    Health Maintenance List  Health Maintenance   Topic Date Due    TDAP/TD VACCINES (1 - Tdap) Never done    Pneumococcal Vaccine 50+ (1 of 1 - PCV) Never done    ZOSTER VACCINE (1 of 2) Never done    COVID-19 Vaccine (7 - 2024-25 season) 09/01/2024    ANNUAL WELLNESS VISIT  01/26/2025    BMI FOLLOWUP  01/26/2025    HEPATITIS C SCREENING  10/28/2025 (Originally 6/14/2023)    DXA SCAN  02/21/2026    MAMMOGRAM  07/25/2026    COLORECTAL CANCER SCREENING  03/14/2028    INFLUENZA VACCINE  Completed                                                                                                                                                CMS Preventative Services Quick Reference  Risk Factors Identified During Encounter  None Identified    The above risks/problems have been discussed with the patient.  Pertinent information has been shared with the patient in the After Visit Summary.  An After Visit Summary and PPPS were made available to the patient.    Follow Up:   Next Medicare Wellness visit to be scheduled in 1 year.     Assessment & Plan  Medicare annual wellness visit, subsequent         Primary hypertension           Vitamin D deficiency         Elevated C-reactive protein (CRP)    Orders:    EZEQUIEL by IFA, Reflex 9-biomarkers profile; Future    C-reactive Protein; Future    Sedimentation Rate; Future    Uric Acid; Future    Rheumatoid Factor; Future    Thyroid Peroxidase Antibody; Future    cefdinir (OMNICEF) 300 MG capsule; Take 1 capsule by mouth 2 (Two) Times a Day for 5 days.       Problem List Items Addressed This Visit          Cardiac and Vasculature    Primary hypertension    Overview     Losartan 50 mg daily            Endocrine and Metabolic    Vitamin D deficiency    Overview     GYN started her on for preventative reasons (bone density)          Other Visit Diagnoses       Medicare annual wellness  visit, subsequent    -  Primary    Elevated C-reactive protein (CRP)        Relevant Orders    EZEQUIEL by IFA, Reflex 9-biomarkers profile    C-reactive Protein    Sedimentation Rate    Uric Acid    Rheumatoid Factor    Thyroid Peroxidase Antibody            The wellness exam has been reviewed in detail.  The patient has been fully counseled on preventative guidelines for vaccines, cancer screenings, and other health maintenance needs.  Functional testing has been performed to assess capacity for independent living and need for other medical interventions.   The patient was counseled on maintaining a lifestyle to promote good health and to minimize chronic diseases.  The patient has been assisted with scheduling healthcare procedures for the coming year and given a written document outlining these recommendations.    Return in about 6 months (around 7/30/2025) for with A1c;.      Follow Up:   Return in about 6 months (around 7/30/2025) for with A1c;.

## 2025-01-30 NOTE — PATIENT INSTRUCTIONS
If CRP is still high we will go ahead and get a chest CT  Repeat A1c in 6 months (fingerstick)        Advance Care Planning and Advance Directives     You make decisions on a daily basis - decisions about where you want to live, your career, your home, your life. Perhaps one of the most important decisions you face is your choice for future medical care. Take time to talk with your family and your healthcare team and start planning today.  Advance Care Planning is a process that can help you:  Understand possible future healthcare decisions in light of your own experiences  Reflect on those decision in light of your goals and values  Discuss your decisions with those closest to you and the healthcare professionals that care for you  Make a plan by creating a document that reflects your wishes    Surrogate Decision Maker  In the event of a medical emergency, which has left you unable to communicate or to make your own decisions, you would need someone to make decisions for you.  It is important to discuss your preferences for medical treatment with this person while you are in good health.     Qualities of a surrogate decision maker:  Willing to take on this role and responsibility  Knows what you want for future medical care  Willing to follow your wishes even if they don't agree with them  Able to make difficult medical decisions under stressful circumstances    Advance Directives  These are legal documents you can create that will guide your healthcare team and decision maker(s) when needed. These documents can be stored in the electronic medical record.    Living Will - a legal document to guide your care if you have a terminal condition or a serious illness and are unable to communicate. States vary by statute in document names/types, but most forms may include one or more of the following:        -  Directions regarding life-prolonging treatments        -  Directions regarding artificially provided  nutrition/hydration        -  Choosing a healthcare decision maker        -  Direction regarding organ/tissue donation    Durable Power of  for Healthcare - this document names an -in-fact to make medical decisions for you, but it may also allow this person to make personal and financial decisions for you. Please seek the advice of an  if you need this type of document.    **Advance Directives are not required and no one may discriminate against you if you do not sign one.    Medical Orders  Many states allow specific forms/orders signed by your physician to record your wishes for medical treatment in your current state of health. This form, signed in personal communication with your physician, addresses resuscitation and other medical interventions that you may or may not want.      For more information or to schedule a time with a [facility] Advance Care Planning Facilitator contact: [facility] or call [phone] and someone will contact you directly.  You are due for adacel Tdap vaccination. (provides protection against tetanus, diptheria and whooping cough) Please  get the immunization at your local pharmacy at your earliest convenience.  Please click on the link for more information about this vaccine.    https://www.cdc.gov/vaccines/vpd/dtap-tdap-td/public/index.html    You are due for Shingrix vaccination series ( the newest shingles vaccine).  It is a two shot series spaced 2-6 months apart. Please get this vaccine series started at your earliest convenience at your local pharmacy to help avoid shingles outbreak. It is more effective than the old Zostavax vaccine and is recommended even if you have had the Zostavax vaccine in the past.  Once the Shingrix series is completed, it does not need to be repeated.   For more information, please look at the website below:  https://www.cdc.gov/vaccines/vpd/shingles/public/shingrix/index.html    You are due for a Covid 19  vaccination. (provides protection against Covid 19 Viral Infection) Please  talk to your pharmacist and get the immunization at your local pharmacy at your earliest convenience. Please click on the link for more information about this vaccine.   https://www.cdc.gov/coronavirus/2019-ncov/vaccines/stay-up-to-date.html        Medicare Wellness  Personal Prevention Plan of Service     Date of Office Visit:    Encounter Provider:  Silvino Carmona DO  Place of Service:  [facility]  Patient Name: Stella Lopez  :  1955    As part of the Medicare Wellness portion of your visit today, we are providing you with this personalized preventive plan of services (PPPS). This plan is based upon recommendations of the [place] Preventive Services Task Force (USPSTF) and the Advisory Committee on Immunization Practices (ACIP).    This lists the preventive care services that should be considered, and provides dates of when you are due. Items listed as completed are up-to-date and do not require any further intervention.    Health Maintenance   Topic Date Due   • TDAP/TD VACCINES (1 - Tdap) Never done   • ZOSTER VACCINE (1 of 2) Never done   • Pneumococcal Vaccine 65+ (1 of 1 - PCV) Never done   • COVID-19 Vaccine ( season) 2024   • ANNUAL WELLNESS VISIT  2025   • BMI FOLLOWUP  2025   • HEPATITIS C SCREENING  10/28/2025 (Originally 2023)   • DXA SCAN  2026   • MAMMOGRAM  2026   • COLORECTAL CANCER SCREENING  2028   • INFLUENZA VACCINE  Completed       Orders Placed This Encounter   Procedures   • EZEQUIEL by IFA, Reflex 9-biomarkers profile     Standing Status:   Future     Standing Expiration Date:   2026     Order Specific Question:   Release to patient     Answer:   Routine Release [[id number]]   • C-reactive Protein     Standing Status:   Future     Standing Expiration Date:   2026     Order Specific Question:   Release to  patient     Answer:   Routine Release [[id number]]   • Sedimentation Rate     Standing Status:   Future     Standing Expiration Date:   1/30/2026     Order Specific Question:   Release to patient     Answer:   Routine Release [[id number]]   • Uric Acid     Standing Status:   Future     Standing Expiration Date:   1/30/2026     Order Specific Question:   Release to patient     Answer:   Routine Release [[id number]]   • Rheumatoid Factor     Standing Status:   Future     Standing Expiration Date:   1/30/2026     Order Specific Question:   Release to patient     Answer:   Routine Release [[id number]]   • Thyroid Peroxidase Antibody     Standing Status:   Future     Standing Expiration Date:   1/30/2026     Order Specific Question:   Release to patient     Answer:   Routine Release [[id number]]       Return in about 6 months (around 7/30/2025) for with A1c;.

## 2025-03-27 DIAGNOSIS — I10 PRIMARY HYPERTENSION: ICD-10-CM

## 2025-03-27 RX ORDER — LOSARTAN POTASSIUM 50 MG/1
50 TABLET ORAL DAILY
Qty: 90 TABLET | Refills: 0 | Status: SHIPPED | OUTPATIENT
Start: 2025-03-27

## 2025-03-27 NOTE — TELEPHONE ENCOUNTER
Rx Refill Note  Requested Prescriptions     Pending Prescriptions Disp Refills    losartan (COZAAR) 50 MG tablet [Pharmacy Med Name: LOSARTAN POTASSIUM 50 MG TAB] 90 tablet 0     Sig: TAKE 1 TABLET BY MOUTH EVERY DAY      Last office visit with prescribing clinician: 1/30/2025   Last telemedicine visit with prescribing clinician: Visit date not found   Next office visit with prescribing clinician: 7/30/2025       Dee Dee Tom MA  03/27/25, 15:00 EDT

## 2025-05-12 ENCOUNTER — HOSPITAL ENCOUNTER (OUTPATIENT)
Dept: GENERAL RADIOLOGY | Facility: HOSPITAL | Age: 70
Discharge: HOME | End: 2025-05-12
Payer: MEDICARE

## 2025-05-12 ENCOUNTER — LAB (OUTPATIENT)
Dept: LAB | Facility: HOSPITAL | Age: 70
End: 2025-05-12
Payer: MEDICARE

## 2025-05-12 ENCOUNTER — OFFICE VISIT (OUTPATIENT)
Dept: FAMILY MEDICINE CLINIC | Facility: CLINIC | Age: 70
End: 2025-05-12
Payer: MEDICARE

## 2025-05-12 VITALS
DIASTOLIC BLOOD PRESSURE: 78 MMHG | SYSTOLIC BLOOD PRESSURE: 116 MMHG | BODY MASS INDEX: 44.18 KG/M2 | OXYGEN SATURATION: 98 % | HEIGHT: 61 IN | HEART RATE: 71 BPM | WEIGHT: 234 LBS

## 2025-05-12 DIAGNOSIS — R10.9 ACUTE RIGHT FLANK PAIN: ICD-10-CM

## 2025-05-12 DIAGNOSIS — R79.82 ELEVATED C-REACTIVE PROTEIN (CRP): ICD-10-CM

## 2025-05-12 DIAGNOSIS — R35.0 FREQUENCY OF URINATION: ICD-10-CM

## 2025-05-12 DIAGNOSIS — R35.0 FREQUENCY OF URINATION: Primary | ICD-10-CM

## 2025-05-12 LAB
ANION GAP SERPL CALCULATED.3IONS-SCNC: 12.5 MMOL/L (ref 5–15)
BILIRUB BLD-MCNC: NEGATIVE MG/DL
BUN SERPL-MCNC: 17 MG/DL (ref 8–23)
BUN/CREAT SERPL: 19.5 (ref 7–25)
CALCIUM SPEC-SCNC: 9.7 MG/DL (ref 8.6–10.5)
CHLORIDE SERPL-SCNC: 102 MMOL/L (ref 98–107)
CHROMATIN AB SERPL-ACNC: <10 IU/ML (ref 0–14)
CLARITY, POC: CLEAR
CO2 SERPL-SCNC: 21.5 MMOL/L (ref 22–29)
COLOR UR: YELLOW
CREAT SERPL-MCNC: 0.87 MG/DL (ref 0.57–1)
CRP SERPL-MCNC: 1.44 MG/DL (ref 0–0.5)
EGFRCR SERPLBLD CKD-EPI 2021: 72.7 ML/MIN/1.73
EXPIRATION DATE: NORMAL
GLUCOSE SERPL-MCNC: 92 MG/DL (ref 65–99)
GLUCOSE UR STRIP-MCNC: NEGATIVE MG/DL
KETONES UR QL: NEGATIVE
LEUKOCYTE EST, POC: NEGATIVE
Lab: NORMAL
NITRITE UR-MCNC: NEGATIVE MG/ML
PH UR: 6 [PH] (ref 5–8)
POTASSIUM SERPL-SCNC: 4.4 MMOL/L (ref 3.5–5.2)
PROT UR STRIP-MCNC: NEGATIVE MG/DL
RBC # UR STRIP: NEGATIVE /UL
SODIUM SERPL-SCNC: 136 MMOL/L (ref 136–145)
SP GR UR: 1.01 (ref 1–1.03)
URATE SERPL-MCNC: 5.2 MG/DL (ref 2.4–5.7)
UROBILINOGEN UR QL: NORMAL

## 2025-05-12 PROCEDURE — 87086 URINE CULTURE/COLONY COUNT: CPT

## 2025-05-12 PROCEDURE — 86140 C-REACTIVE PROTEIN: CPT

## 2025-05-12 PROCEDURE — 84550 ASSAY OF BLOOD/URIC ACID: CPT

## 2025-05-12 PROCEDURE — 85652 RBC SED RATE AUTOMATED: CPT

## 2025-05-12 PROCEDURE — 80048 BASIC METABOLIC PNL TOTAL CA: CPT

## 2025-05-12 PROCEDURE — 1126F AMNT PAIN NOTED NONE PRSNT: CPT | Performed by: FAMILY MEDICINE

## 2025-05-12 PROCEDURE — 86431 RHEUMATOID FACTOR QUANT: CPT

## 2025-05-12 PROCEDURE — 3078F DIAST BP <80 MM HG: CPT | Performed by: FAMILY MEDICINE

## 2025-05-12 PROCEDURE — 86038 ANTINUCLEAR ANTIBODIES: CPT

## 2025-05-12 PROCEDURE — 3074F SYST BP LT 130 MM HG: CPT | Performed by: FAMILY MEDICINE

## 2025-05-12 PROCEDURE — 99214 OFFICE O/P EST MOD 30 MIN: CPT | Performed by: FAMILY MEDICINE

## 2025-05-12 PROCEDURE — 86376 MICROSOMAL ANTIBODY EACH: CPT

## 2025-05-12 PROCEDURE — 72100 X-RAY EXAM L-S SPINE 2/3 VWS: CPT

## 2025-05-13 LAB — ERYTHROCYTE [SEDIMENTATION RATE] IN BLOOD: 33 MM/HR (ref 0–30)

## 2025-05-14 LAB
BACTERIA SPEC AEROBE CULT: NORMAL
THYROPEROXIDASE AB SERPL-ACNC: <9 IU/ML (ref 0–34)

## 2025-05-15 DIAGNOSIS — I10 PRIMARY HYPERTENSION: ICD-10-CM

## 2025-05-16 LAB
ANA SER QL IF: NEGATIVE
LABORATORY COMMENT REPORT: NORMAL

## 2025-05-16 RX ORDER — LOSARTAN POTASSIUM 50 MG/1
50 TABLET ORAL DAILY
Qty: 90 TABLET | Refills: 0 | Status: SHIPPED | OUTPATIENT
Start: 2025-05-16

## 2025-05-16 NOTE — TELEPHONE ENCOUNTER
Rx Refill Note  Requested Prescriptions     Pending Prescriptions Disp Refills    losartan (COZAAR) 50 MG tablet [Pharmacy Med Name: LOSARTAN POTASSIUM 50 MG TAB] 90 tablet 0     Sig: TAKE 1 TABLET BY MOUTH EVERY DAY      Last office visit with prescribing clinician: 5/12/2025   Last telemedicine visit with prescribing clinician: Visit date not found   Next office visit with prescribing clinician: 7/30/2025                         Would you like a call back once the refill request has been completed: [] Yes [] No    If the office needs to give you a call back, can they leave a voicemail: [] Yes [] No    Magali Mcbride MA  05/16/25, 09:08 EDT

## 2025-06-02 NOTE — PROGRESS NOTES
Established Patient Office Visit      Patient Name: Stella Lopez  : 1955   MRN: [id number]   Care Team: Patient Care Team:  Silvino Carmona DO as PCP - General (Family Medicine)    Chief Complaint:    Chief Complaint   Patient presents with    Pain     Started in her right side lower back, and started having pains in her groin area. Urinating more often       History of Present Illness: Stella Lopez is a 70 y.o. female who is here today for chief complaint.    Pain  Symptoms include abdominal pain.    Back Pain  Chronicity:  New  Onset:  In the past 7 days  Frequency:  Constantly  Progression since onset:  Worsening  Pain location:  Sacro-iliac  Pain quality:  Aching  Radiates to:  Does not radiate  Pain-numeric:  10/10  Pain is:  Worse during the night  Aggravated by:  Bending  Stiffness is present:  In the morning  Associated symptoms: abdominal pain and tingling    Risk factors:  Obesity      In her right lower back    This patient is accompanied by their self who contributes to the history of their care.    The following portions of the patient's history were reviewed and updated as appropriate: allergies, current medications, past family history, past medical history, past social history, past surgical history and problem list.    Subjective      Review of Systems:   Review of Systems   Gastrointestinal:  Positive for abdominal pain.   Musculoskeletal:  Positive for back pain.   Neurological:  Positive for tingling.    - See HPI    Past Medical History:   Past Medical History:   Diagnosis Date    Allergic 1970    I had allergies most of my life    GERD (gastroesophageal reflux disease) 2020    Meds helps    Hypertension 3/15/2010    Date is approx    Obesity 2005    Ever since menapose       Past Surgical History:   Past Surgical History:   Procedure Laterality Date    COLONOSCOPY  3/27/22    Polyps  removed non cancerous       Family History:   Family History   Problem  Relation Age of Onset    Breast cancer Mother 31    Cancer Mother         She  had breast and passed away      Breast cancer Paternal Aunt     Ovarian cancer Neg Hx        Social History:   Social History     Socioeconomic History    Marital status:    Tobacco Use    Smoking status: Former     Current packs/day: 0.00     Average packs/day: 1 pack/day for 11.0 years (11.0 ttl pk-yrs)     Types: Cigarettes     Start date: 1974     Quit date: 1985     Years since quittin.0     Passive exposure: Never    Smokeless tobacco: Never    Tobacco comments:     Quit several yrs agoand haven’t smoked since   Vaping Use    Vaping status: Never Used   Substance and Sexual Activity    Alcohol use: Yes     Alcohol/week: 1.0 standard drink of alcohol     Types: 1 Drinks containing 0.5 oz of alcohol per week     Comment: Occasionally    Drug use: Never    Sexual activity: Not Currently     Partners: Male     Birth control/protection: Pill     Comment: Na       Tobacco History:   Social History     Tobacco Use   Smoking Status Former    Current packs/day: 0.00    Average packs/day: 1 pack/day for 11.0 years (11.0 ttl pk-yrs)    Types: Cigarettes    Start date: 1974    Quit date: 1985    Years since quittin.0    Passive exposure: Never   Smokeless Tobacco Never   Tobacco Comments    Quit several yrs agoand haven’t smoked since       Medications:   Outpatient Medications Prior to Visit   Medication Sig Dispense Refill    albuterol sulfate  (90 Base) MCG/ACT inhaler Inhale 2 puffs Every 4 (Four) Hours As Needed for Wheezing or Shortness of Air. 8 g 1    fexofenadine (Allegra Allergy) 180 MG tablet Take 1 tablet by mouth Daily. 90 tablet 3    fluticasone (FLONASE) 50 MCG/ACT nasal spray Administer 1 spray into the nostril(s) as directed by provider Daily. 16 g 11    ipratropium (ATROVENT) 0.06 % nasal spray INSTILL 2 SPRAYS INTO EACH NOSTRIL EVERY 3-4 HOURS AS NEEDED.      simethicone  "(MYLICON,GAS-X) 125 MG capsule capsule Take 1 capsule by mouth Every 6 (Six) Hours As Needed for Flatulence. 30 capsule 0    Vitamin D, Cholecalciferol, 50 MCG (2000 UT) capsule Take 1 capsule by mouth Daily. Indications: Vitamin D Deficiency      losartan (COZAAR) 50 MG tablet TAKE 1 TABLET BY MOUTH EVERY DAY 90 tablet 0     No facility-administered medications prior to visit.        Allergies:   No Known Allergies    Objective   Objective     Physical Exam:  Vital Signs:   Vitals:    05/12/25 1318   BP: 116/78   Pulse: 71   SpO2: 98%   Weight: 106 kg (234 lb)   Height: 153.7 cm (60.51\")     Body mass index is 44.93 kg/m².     Physical Exam  Nursing note reviewed  Const: NAD, A&Ox4, Pleasant, Cooperative  Eyes: EOMI, no conjunctivitis  ENT: No nasal discharge present, neck supple  Cardiac: Regular rate and rhythm, no cyanosis  Resp: Respiratory rate within normal limits, no increased work of breathing, no audible wheezing or retractions noted  GI: No distention or ascites  MSK: Motor and sensation grossly intact in bilateral upper extremities  Neurologic: CN II-XII grossly intact  Psych: Appropriate mood and behavior.  Skin: Warm, dry  Procedures/Radiology     Procedures  No radiology results for the last 7 days     Assessment & Plan   Assessment / Plan      Assessment/Plan:   Problems Addressed This Visit  Diagnoses and all orders for this visit:    1. Frequency of urination (Primary)  -     POC Urinalysis Dipstick, Automated  -     Basic Metabolic Panel; Future  -     Urine Culture - Urine, Urine, Clean Catch; Future    2. Elevated C-reactive protein (CRP)  -     Thyroid Peroxidase Antibody; Future  -     Rheumatoid Factor; Future  -     Uric Acid; Future  -     Sedimentation Rate; Future  -     C-reactive Protein; Future  -     EZEQUIEL by IFA, Reflex 9-biomarkers profile; Future    3. Acute right flank pain  -     Basic Metabolic Panel; Future  -     Urine Culture - Urine, Urine, Clean Catch; Future  -     XR Spine " Lumbar 2 or 3 View; Future      Problem List Items Addressed This Visit    None  Visit Diagnoses         Frequency of urination    -  Primary    Relevant Orders    POC Urinalysis Dipstick, Automated (Completed)    Basic Metabolic Panel (Completed)    Urine Culture - Urine, Urine, Clean Catch (Completed)      Elevated C-reactive protein (CRP)        Relevant Orders    Thyroid Peroxidase Antibody    Rheumatoid Factor    Uric Acid    Sedimentation Rate    C-reactive Protein    EZEQUIEL by IFA, Reflex 9-biomarkers profile      Acute right flank pain        Relevant Orders    Basic Metabolic Panel (Completed)    Urine Culture - Urine, Urine, Clean Catch (Completed)    XR Spine Lumbar 2 or 3 View (Completed)            No orders of the defined types were placed in this encounter.       Patient Instructions   Tylenol, rest, heat, and Miralax for constipation    Follow Up:   Return if symptoms worsen or fail to improve.      DO SHANELLE Chapman RD  [facility]  [address]  [facility] [id number]  Fax [phone]  Phone [phone]    Disclaimer to patients: The 21[employer] C[employer] Act makes medical notes like these available to patients in the interest of transparency. However, please be advised that this is still a medical document. It is intended as [id number] communication. Many sections may include medical language or jargon, abbreviations, and additional verbiage that are unfamiliar or confusing. In some ways it may come across as blunt, direct, or may be summarized in order to clearly and concisely communicate the most crucial information to medical professionals. It may also include mentions of conditions that are unlikely but considered as part of the differential diagnosis, including serious disorders. These are not always discussed at length at the time of appointment because their likelihood is so low, but may be included in a medical note  to make it clear what has been considered and/or ruled out as part of a work-up. Medical documents are intended to carry relevant information, facts as evident, and the personal clinical opinion of the physician. If you have any questions regarding this medical document, please bring them to the attention of the physician at your next scheduled appointment.

## 2025-06-11 RX ORDER — IPRATROPIUM BROMIDE 42 UG/1
1 SPRAY, METERED NASAL 3 TIMES DAILY PRN
Qty: 45 ML | Refills: 3 | Status: SHIPPED | OUTPATIENT
Start: 2025-06-11

## 2025-07-29 ENCOUNTER — TRANSCRIBE ORDERS (OUTPATIENT)
Dept: ADMINISTRATIVE | Facility: HOSPITAL | Age: 70
End: 2025-07-29
Payer: MEDICARE

## 2025-07-29 DIAGNOSIS — Z12.31 VISIT FOR SCREENING MAMMOGRAM: Primary | ICD-10-CM

## 2025-07-30 ENCOUNTER — OFFICE VISIT (OUTPATIENT)
Dept: FAMILY MEDICINE CLINIC | Facility: CLINIC | Age: 70
End: 2025-07-30
Payer: MEDICARE

## 2025-07-30 VITALS
DIASTOLIC BLOOD PRESSURE: 82 MMHG | BODY MASS INDEX: 44.18 KG/M2 | WEIGHT: 234 LBS | OXYGEN SATURATION: 98 % | HEIGHT: 61 IN | SYSTOLIC BLOOD PRESSURE: 126 MMHG | HEART RATE: 76 BPM

## 2025-07-30 DIAGNOSIS — R73.03 PREDIABETES: ICD-10-CM

## 2025-07-30 DIAGNOSIS — I10 PRIMARY HYPERTENSION: ICD-10-CM

## 2025-07-30 DIAGNOSIS — M48.56XS COMPRESSION FRACTURE OF LUMBAR SPINE, NON-TRAUMATIC, SEQUELA: Primary | ICD-10-CM

## 2025-07-30 DIAGNOSIS — E66.813 CLASS 3 SEVERE OBESITY DUE TO EXCESS CALORIES WITH SERIOUS COMORBIDITY AND BODY MASS INDEX (BMI) OF 40.0 TO 44.9 IN ADULT: ICD-10-CM

## 2025-07-30 LAB
EXPIRATION DATE: NORMAL
HBA1C MFR BLD: 5.5 % (ref 4.5–5.7)
Lab: NORMAL

## 2025-07-30 RX ORDER — SEMAGLUTIDE 0.68 MG/ML
INJECTION, SOLUTION SUBCUTANEOUS
Qty: 3 ML | Refills: 0 | Status: SHIPPED | OUTPATIENT
Start: 2025-07-30 | End: 2025-09-10

## 2025-07-31 ENCOUNTER — PRIOR AUTHORIZATION (OUTPATIENT)
Dept: FAMILY MEDICINE CLINIC | Facility: CLINIC | Age: 70
End: 2025-07-31
Payer: MEDICARE

## 2025-07-31 NOTE — TELEPHONE ENCOUNTER
(Key: [id number]) - [id number]  Ozempic (0.25 or 0.5 MG/DOSE) 2MG/3ML pen-injectors  status: PA RequestCreated: July 30th, 2025 [phone]: July 31st, 2025    Why was coverage for this drug denied?  We denied coverage for this drug because: The information provided by your prescriber did not meet the   requirements for covering this medication (prior authorization).   Your plan does not allow coverage of this medication based on your prescriber answering No to the   following question(s):  Is the requested drug being prescribed to reduce the risk of major adverse cardiovascular (CV) events in   an adult patient with type 2 diabetes mellitus and established CV disease?   Is the requested drug being prescribed to reduce the risk of estimated glomerular filtration rate (eGFR)   decline, end-stage kidney disease, and cardiovascular death in an adult with type 2 diabetes mellitus and   established chronic kidney disease?   Is the requested drug being prescribed to improve glycemic control in an adult patient with type 2 diabetes   mellitus? [Note: Ozempic is FDA (Food and Drug Administration)-labeled for type 2 diabetes mellitus and   is not indicated for prediabetes.]  Share this notice with your prescribing provider and discuss next steps. If your prescribing provider asked for   coverage for this drug on your behalf, we already shared this denial notice with them.    Fax: [phone]  Online: [email or web address]  Address: [employer]   [address] [address]  [place] [address]

## 2025-08-01 ENCOUNTER — HOSPITAL ENCOUNTER (OUTPATIENT)
Dept: GENERAL RADIOLOGY | Facility: HOSPITAL | Age: 70
Discharge: HOME | End: 2025-08-01
Payer: MEDICARE

## 2025-08-01 DIAGNOSIS — R73.03 PREDIABETES: ICD-10-CM

## 2025-08-01 PROCEDURE — 72100 X-RAY EXAM L-S SPINE 2/3 VWS: CPT

## 2025-08-13 ENCOUNTER — DOCUMENTATION (OUTPATIENT)
Dept: GENETICS | Facility: HOSPITAL | Age: 70
End: 2025-08-13
Payer: MEDICARE

## 2025-08-13 ENCOUNTER — RESULTS FOLLOW-UP (OUTPATIENT)
Facility: HOSPITAL | Age: 70
End: 2025-08-13
Payer: MEDICARE

## 2025-08-13 LAB
NCCN CRITERIA FLAG: ABNORMAL
TYRER CUZICK SCORE: 8.4

## 2025-08-25 ENCOUNTER — OFFICE VISIT (OUTPATIENT)
Dept: NEUROSURGERY | Facility: CLINIC | Age: 70
End: 2025-08-25
Payer: MEDICARE

## 2025-08-25 VITALS — WEIGHT: 231 LBS | BODY MASS INDEX: 45.35 KG/M2 | TEMPERATURE: 97 F | HEIGHT: 60 IN

## 2025-08-25 DIAGNOSIS — M54.50 ACUTE MIDLINE LOW BACK PAIN WITHOUT SCIATICA: ICD-10-CM

## 2025-08-25 DIAGNOSIS — S22.080A COMPRESSION FRACTURE OF T12 VERTEBRA, INITIAL ENCOUNTER: ICD-10-CM

## 2025-08-25 DIAGNOSIS — S32.030A COMPRESSION FRACTURE OF L3 VERTEBRA, INITIAL ENCOUNTER: ICD-10-CM

## 2025-08-25 DIAGNOSIS — S32.020A COMPRESSION FRACTURE OF L2 VERTEBRA, INITIAL ENCOUNTER: ICD-10-CM

## 2025-08-25 DIAGNOSIS — S22.080A COMPRESSION FRACTURE OF T12 VERTEBRA, INITIAL ENCOUNTER: Primary | ICD-10-CM

## 2025-08-25 DIAGNOSIS — F40.240 CLAUSTROPHOBIA: ICD-10-CM

## 2025-08-25 DIAGNOSIS — M85.89 OSTEOPENIA OF MULTIPLE SITES: ICD-10-CM

## 2025-08-25 DIAGNOSIS — S32.000G COMPRESSION FRACTURE OF LUMBAR VERTEBRA WITH DELAYED HEALING, UNSPECIFIED LUMBAR VERTEBRAL LEVEL, SUBSEQUENT ENCOUNTER: Primary | ICD-10-CM

## 2025-08-25 PROCEDURE — 1160F RVW MEDS BY RX/DR IN RCRD: CPT

## 2025-08-25 PROCEDURE — 99204 OFFICE O/P NEW MOD 45 MIN: CPT

## 2025-08-25 PROCEDURE — 1159F MED LIST DOCD IN RCRD: CPT

## 2025-08-26 RX ORDER — DIAZEPAM 10 MG/1
10 TABLET ORAL
Qty: 1 TABLET | Refills: 0 | Status: SHIPPED | OUTPATIENT
Start: 2025-08-26

## 2025-08-28 ENCOUNTER — HOSPITAL ENCOUNTER (OUTPATIENT)
Dept: MAMMOGRAPHY | Facility: HOSPITAL | Age: 70
Discharge: HOME | End: 2025-08-28
Admitting: FAMILY MEDICINE
Payer: MEDICARE

## 2025-08-28 DIAGNOSIS — Z12.31 VISIT FOR SCREENING MAMMOGRAM: ICD-10-CM

## 2025-08-28 PROCEDURE — 77063 BREAST TOMOSYNTHESIS BI: CPT

## 2025-08-28 PROCEDURE — 77067 SCR MAMMO BI INCL CAD: CPT
